# Patient Record
Sex: FEMALE | Race: WHITE | NOT HISPANIC OR LATINO | Employment: UNEMPLOYED | URBAN - METROPOLITAN AREA
[De-identification: names, ages, dates, MRNs, and addresses within clinical notes are randomized per-mention and may not be internally consistent; named-entity substitution may affect disease eponyms.]

---

## 2022-05-15 ENCOUNTER — HOSPITAL ENCOUNTER (EMERGENCY)
Facility: HOSPITAL | Age: 27
Discharge: HOME | End: 2022-05-16
Attending: FAMILY MEDICINE
Payer: MEDICAID

## 2022-05-15 DIAGNOSIS — K52.9 GASTROENTERITIS: Primary | ICD-10-CM

## 2022-05-15 LAB
APPEARANCE UR: CLEAR
B-HCG SERPL QL: NEGATIVE
BILIRUB UR QL STRIP.AUTO: NEGATIVE MG/DL
COLOR UR AUTO: YELLOW
GLUCOSE UR QL STRIP.AUTO: NEGATIVE MG/DL
KETONES UR QL STRIP.AUTO: NEGATIVE MG/DL
LEUKOCYTE ESTERASE UR QL STRIP.AUTO: NEGATIVE UNIT/L
MUCOUS THREADS URNS QL MICRO: ABNORMAL /LPF
NITRITE UR QL STRIP.AUTO: NEGATIVE
PH UR STRIP.AUTO: 5.5 [PH]
PROT UR QL STRIP.AUTO: ABNORMAL MG/DL
RBC UR QL AUTO: NEGATIVE UNIT/L
SP GR UR STRIP.AUTO: >=1.03
SQUAMOUS #/AREA URNS AUTO: ABNORMAL /LPF
UROBILINOGEN UR STRIP-ACNC: 0.2 MG/DL

## 2022-05-15 PROCEDURE — 36415 COLL VENOUS BLD VENIPUNCTURE: CPT | Performed by: FAMILY MEDICINE

## 2022-05-15 PROCEDURE — 81025 URINE PREGNANCY TEST: CPT | Performed by: FAMILY MEDICINE

## 2022-05-15 PROCEDURE — 99284 EMERGENCY DEPT VISIT MOD MDM: CPT

## 2022-05-15 PROCEDURE — 25000003 PHARM REV CODE 250: Performed by: FAMILY MEDICINE

## 2022-05-15 PROCEDURE — 81001 URINALYSIS AUTO W/SCOPE: CPT | Performed by: FAMILY MEDICINE

## 2022-05-15 RX ORDER — ARIPIPRAZOLE 5 MG/1
5 TABLET ORAL
COMMUNITY
Start: 2022-01-05 | End: 2023-08-30

## 2022-05-15 RX ORDER — FLUOXETINE HYDROCHLORIDE 20 MG/1
40 CAPSULE ORAL
COMMUNITY
Start: 2022-01-05 | End: 2023-08-30

## 2022-05-15 RX ORDER — TRAZODONE HYDROCHLORIDE 50 MG/1
50 TABLET ORAL
COMMUNITY
Start: 2022-01-05 | End: 2023-08-30

## 2022-05-15 RX ORDER — ONDANSETRON 4 MG/1
4 TABLET, ORALLY DISINTEGRATING ORAL
Status: COMPLETED | OUTPATIENT
Start: 2022-05-15 | End: 2022-05-15

## 2022-05-15 RX ORDER — DICYCLOMINE HYDROCHLORIDE 10 MG/1
20 CAPSULE ORAL
COMMUNITY
End: 2022-05-16

## 2022-05-15 RX ORDER — OXCARBAZEPINE 300 MG/1
300 TABLET, FILM COATED ORAL
COMMUNITY
Start: 2022-01-05 | End: 2023-08-30

## 2022-05-15 RX ORDER — SPIRONOLACTONE 25 MG/1
25 TABLET ORAL DAILY
COMMUNITY
End: 2023-08-30

## 2022-05-15 RX ADMIN — ONDANSETRON 4 MG: 4 TABLET, ORALLY DISINTEGRATING ORAL at 11:05

## 2022-05-15 NOTE — Clinical Note
"Tressa Bensonolga Zuleta was seen and treated in our emergency department on 5/15/2022.  She may return to work on 05/18/2022.       If you have any questions or concerns, please don't hesitate to call.      Milton HANSON    "

## 2022-05-16 VITALS
DIASTOLIC BLOOD PRESSURE: 78 MMHG | HEIGHT: 69 IN | OXYGEN SATURATION: 98 % | TEMPERATURE: 98 F | RESPIRATION RATE: 18 BRPM | SYSTOLIC BLOOD PRESSURE: 118 MMHG | HEART RATE: 96 BPM | BODY MASS INDEX: 37.77 KG/M2 | WEIGHT: 255 LBS

## 2022-05-16 PROCEDURE — 63600175 PHARM REV CODE 636 W HCPCS: Performed by: FAMILY MEDICINE

## 2022-05-16 PROCEDURE — 96372 THER/PROPH/DIAG INJ SC/IM: CPT | Performed by: FAMILY MEDICINE

## 2022-05-16 RX ORDER — ONDANSETRON 4 MG/1
4 TABLET, FILM COATED ORAL EVERY 6 HOURS
Qty: 20 TABLET | Refills: 0 | Status: SHIPPED | OUTPATIENT
Start: 2022-05-16 | End: 2022-05-21

## 2022-05-16 RX ORDER — DICYCLOMINE HYDROCHLORIDE 10 MG/ML
20 INJECTION INTRAMUSCULAR
Status: COMPLETED | OUTPATIENT
Start: 2022-05-16 | End: 2022-05-16

## 2022-05-16 RX ORDER — DICYCLOMINE HYDROCHLORIDE 20 MG/1
20 TABLET ORAL 2 TIMES DAILY
Qty: 10 TABLET | Refills: 0 | Status: SHIPPED | OUTPATIENT
Start: 2022-05-16 | End: 2022-05-21

## 2022-05-16 RX ADMIN — DICYCLOMINE HYDROCHLORIDE 20 MG: 20 INJECTION, SOLUTION INTRAMUSCULAR at 12:05

## 2022-05-16 NOTE — ED PROVIDER NOTES
Encounter Date: 5/15/2022       History     Chief Complaint   Patient presents with    Abdominal Pain     Onset yesterday abdominal cramps,subjective fever,N,V,D     27-year-old female with essentially no past medical history presents with nausea vomiting diarrhea the past 3 days.  Patient states her vomiting has resolved and she only has mild nausea.  She is still having diarrhea.  She also admits to generalized abdominal cramping.  Denies fever or sick contacts.  Denies recent travel.  She is drinking a chocolate milk in exam room.  No other complaints.        Review of patient's allergies indicates:   Allergen Reactions    Amoxicillin Rash     Past Medical History:   Diagnosis Date    Bipolar disorder, unspecified      Past Surgical History:   Procedure Laterality Date    CHOLECYSTECTOMY       No family history on file.  Social History     Tobacco Use    Smoking status: Never Smoker    Smokeless tobacco: Never Used   Substance Use Topics    Alcohol use: Never    Drug use: Never     Review of Systems   Constitutional: Negative.    HENT: Negative.    Eyes: Negative.    Respiratory: Negative.    Cardiovascular: Negative.    Gastrointestinal: Positive for diarrhea, nausea and vomiting.   Endocrine: Negative.    Genitourinary: Negative.    Musculoskeletal: Negative.    Skin: Negative.    Allergic/Immunologic: Negative.    Neurological: Negative.    Hematological: Negative.    Psychiatric/Behavioral: Negative.        Physical Exam     Initial Vitals [05/15/22 2124]   BP Pulse Resp Temp SpO2   (!) 124/90 95 20 97.9 °F (36.6 °C) 97 %      MAP       --         Physical Exam    Nursing note and vitals reviewed.  Constitutional: She appears well-developed.   HENT:   Head: Normocephalic and atraumatic.   Eyes: Pupils are equal, round, and reactive to light.   Neck:   Normal range of motion.  Cardiovascular: Normal rate and regular rhythm.   Pulmonary/Chest: Breath sounds normal.   Abdominal: Abdomen is soft. Bowel  sounds are normal. She exhibits no distension. There is no abdominal tenderness.   Musculoskeletal:         General: Normal range of motion.      Cervical back: Normal range of motion.     Neurological: She is alert and oriented to person, place, and time.   Skin: Capillary refill takes less than 2 seconds.   Psychiatric: She has a normal mood and affect.         ED Course   Procedures  Labs Reviewed   URINALYSIS, REFLEX TO URINE CULTURE - Abnormal; Notable for the following components:       Result Value    Protein, UA Trace (*)     All other components within normal limits   URINALYSIS, MICROSCOPIC - Abnormal; Notable for the following components:    Mucous, UA Small (*)     Squamous Epithelial Cells, UA Few (*)     All other components within normal limits   PREGNANCY TEST, URINE RAPID - Normal   HCG, QUANTITATIVE          Imaging Results    None          Medications   ondansetron disintegrating tablet 4 mg (4 mg Oral Given 5/15/22 5951)   dicyclomine injection 20 mg (20 mg Intramuscular Given 5/16/22 0018)     Medical Decision Making:   ED Management:  Patient is nontoxic appearing and in no acute distress.  Vital signs stable.  Benign physical exam.  No episodes of vomiting or diarrhea throughout her ED stay.  Will treat for gastroenteritis.  Encourage clear liquids for the next 4-6 hours.  Advance diet as tolerated.  Call follow-up with PCP as soon as possible.  Strict return to ER precautions given, patient voiced understanding.                      Clinical Impression:   Final diagnoses:  [K52.9] Gastroenteritis (Primary)          ED Disposition Condition    Discharge Stable        ED Prescriptions     Medication Sig Dispense Start Date End Date Auth. Provider    ondansetron (ZOFRAN) 4 MG tablet Take 1 tablet (4 mg total) by mouth every 6 (six) hours. for 5 days 20 tablet 5/16/2022 5/21/2022 Husam Hamilton MD    dicyclomine (BENTYL) 20 mg tablet Take 1 tablet (20 mg total) by mouth 2 (two) times daily. for  5 days 10 tablet 5/16/2022 5/21/2022 Husam Hamilton MD        Follow-up Information     Follow up With Specialties Details Why Contact Info    Ness Garcia, ELLIE [facility]   [address] [place] [address]/[facility] Center[facility] [place] [address]  [phone]             Husam Hamilton MD  05/16/22 0041

## 2022-09-14 ENCOUNTER — HOSPITAL ENCOUNTER (EMERGENCY)
Facility: HOSPITAL | Age: 27
Discharge: HOME | End: 2022-09-14
Attending: INTERNAL MEDICINE
Payer: MEDICAID

## 2022-09-14 VITALS
OXYGEN SATURATION: 100 % | SYSTOLIC BLOOD PRESSURE: 178 MMHG | HEIGHT: 68 IN | TEMPERATURE: 98 F | HEART RATE: 75 BPM | DIASTOLIC BLOOD PRESSURE: 75 MMHG | BODY MASS INDEX: 38.19 KG/M2 | WEIGHT: 252 LBS | RESPIRATION RATE: 18 BRPM

## 2022-09-14 DIAGNOSIS — J40 BRONCHITIS: Primary | ICD-10-CM

## 2022-09-14 DIAGNOSIS — H65.192 OTHER NON-RECURRENT ACUTE NONSUPPURATIVE OTITIS MEDIA OF LEFT EAR: ICD-10-CM

## 2022-09-14 PROCEDURE — 96372 THER/PROPH/DIAG INJ SC/IM: CPT | Performed by: INTERNAL MEDICINE

## 2022-09-14 PROCEDURE — 99284 EMERGENCY DEPT VISIT MOD MDM: CPT | Mod: 25

## 2022-09-14 PROCEDURE — 63600175 PHARM REV CODE 636 W HCPCS: Performed by: INTERNAL MEDICINE

## 2022-09-14 RX ORDER — PREDNISONE 20 MG/1
60 TABLET ORAL DAILY
COMMUNITY
Start: 2022-09-13 | End: 2023-08-30

## 2022-09-14 RX ORDER — FLUTICASONE PROPIONATE 50 MCG
1 SPRAY, SUSPENSION (ML) NASAL 2 TIMES DAILY PRN
Qty: 16 G | Refills: 0 | Status: SHIPPED | OUTPATIENT
Start: 2022-09-14 | End: 2023-08-30

## 2022-09-14 RX ORDER — ALBUTEROL SULFATE 90 UG/1
2 AEROSOL, METERED RESPIRATORY (INHALATION) EVERY 6 HOURS PRN
Qty: 18 G | Refills: 0 | Status: SHIPPED | OUTPATIENT
Start: 2022-09-14 | End: 2023-08-30

## 2022-09-14 RX ORDER — AZITHROMYCIN 250 MG/1
TABLET, FILM COATED ORAL
Qty: 6 TABLET | Refills: 0 | Status: SHIPPED | OUTPATIENT
Start: 2022-09-14 | End: 2023-08-30

## 2022-09-14 RX ORDER — METHYLPREDNISOLONE SOD SUCC 125 MG
125 VIAL (EA) INJECTION ONCE
Status: COMPLETED | OUTPATIENT
Start: 2022-09-14 | End: 2022-09-14

## 2022-09-14 RX ORDER — NORETHINDRONE ACETATE AND ETHINYL ESTRADIOL, ETHINYL ESTRADIOL AND FERROUS FUMARATE 1MG-10(24)
1 KIT ORAL DAILY
COMMUNITY
Start: 2022-08-27 | End: 2023-08-30

## 2022-09-14 RX ADMIN — METHYLPREDNISOLONE SODIUM SUCCINATE 125 MG: 125 INJECTION, POWDER, FOR SOLUTION INTRAMUSCULAR; INTRAVENOUS at 09:09

## 2022-09-14 NOTE — Clinical Note
"Tressa Senior" Merlyn was seen and treated in our emergency department on 9/14/2022.  She may return to work on 09/15/2022.       If you have any questions or concerns, please don't hesitate to call.      renetta HANSON    "

## 2022-09-14 NOTE — ED TRIAGE NOTES
Pt complaint of (1) cough and fever 99.2 last night with evaluation at [facility] relating that she noticed blood with cough this am and (2) urine is a dark orange color

## 2022-09-17 NOTE — ED PROVIDER NOTES
Source of History:  Patient, no limitations    Chief complaint:  Recheck (Pt complaint of (1) cough and fever 99.2 last ngith with evaluation at [facility] relating that she noticed blood with cough this am and (2) urine is a dark orange color)      HPI:  Tressa Zuleta is a 27 y.o. female presenting with Recheck (Pt complaint of (1) cough and fever 99.2 last ngith with evaluation at [facility] relating that she noticed blood with cough this am and (2) urine is a dark orange color)       Cough, subjective fever and chills, left ear pain, sore throat. Seen at urgent care last night swabs were negative for strep and covid, prescribed Tessalon Perles and p.o. steroids  The patient complains of productive cough. Onset of symptoms was abrupt starting 2 days ago. Symptom course has been intermittent, while severity at worst was moderate. Symptoms tend to occur with cough. Symptoms are aggravated by nothing, alleviated by rest and have been associated with blood tinged sputum.    Review of Systems   Constitutional symptoms:  Negative except as documented in HPI.   Skin symptoms:  Negative except as documented in HPI.   HEENT symptoms:  Negative except as documented in HPI.   Respiratory symptoms:  Negative except as documented in HPI.   Cardiovascular symptoms:  Negative except as documented in HPI.   Gastrointestinal symptoms:  Negative except as documented in HPI.    Genitourinary symptoms:  Negative except as documented in HPI.   Musculoskeletal symptoms:  Negative except as documented in HPI.   Neurologic symptoms:  Negative except as documented in HPI.   Psychiatric symptoms:  Negative except as documented in HPI.   Allergy/immunologic symptoms:  Negative except as documented in HPI.             Additional review of systems information: All other systems reviewed and otherwise negative.      Review of patient's allergies indicates:   Allergen Reactions    Amoxicillin Rash       PMH:  As per  "HPI and below:    Past Medical History:   Diagnosis Date    Bipolar disorder, unspecified         No family history on file.    Past Surgical History:   Procedure Laterality Date    CHOLECYSTECTOMY         Social History     Tobacco Use    Smoking status: Never    Smokeless tobacco: Never   Substance Use Topics    Alcohol use: Never    Drug use: Never       There is no problem list on file for this patient.       Physical Exam:    BP (!) 178/75 (BP Location: Left arm, Patient Position: Sitting)   Pulse 75   Temp 97.9 °F (36.6 °C) (Oral)   Resp 18   Ht 5' 8" (1.727 m)   Wt 114.3 kg (252 lb)   LMP 09/01/2022   SpO2 100%   Breastfeeding No   BMI 38.32 kg/m²     Nursing note and vital signs reviewed.    General:  Alert, no acute distress.   Skin: Normal for Ethnic Origin, No cyanosis  HEENT: Normocephalic and atraumatic, Vision unchanged, Pupils symmetric, No icterus , Nasal mucosa is pink and moist, left OM scar tissue with some erythema, mild pharyngeal erythema  Cardiovascular:  Regular rate and rhythm, No edema  Chest Wall: No deformity, equal chest rise  Respiratory:  Lungs are clear to auscultation, respirations are non-labored.    Musculoskeletal:  No deformity, Normal perfusion to all extremities  Back: No bony tenderness  : No suprapubic pain, No CVA tenderness  Gastrointestinal:  Soft, Nontender, Non distended, Normal bowel sounds.    Neurological:  Alert and oriented to person, place, time, and situation, normal motor observed, normal speech observed.    Psychiatric:  Cooperative, appropriate mood & affect.        Labs that have been ordered have been independently reviewed and interpreted by myself.     Old Chart Reviewed.      Initial Impression/ Differential Dx:  Bronchitis, URI, allergies, irritants, pulmonary edema, GERD, laryngitis, tracheitis, asthma, sinusitis, pneumonia, viral, COPD, medication side effect      MDM:      Reviewed Nurses Note.    Reviewed Pertinent old records.    Orders " Placed This Encounter    X-Ray Chest PA And Lateral    Urinalysis, Reflex to Urine Culture Urine, Clean Catch    Pregnancy, urine rapid    albuterol (VENTOLIN HFA) 90 mcg/actuation inhaler    fluticasone propionate (FLONASE) 50 mcg/actuation nasal spray    methylPREDNISolone sodium succinate injection 125 mg    azithromycin (Z-SIMONE) 250 MG tablet                    Labs Reviewed   URINALYSIS, REFLEX TO URINE CULTURE   PREGNANCY TEST, URINE RAPID          X-Ray Chest PA And Lateral   Final Result      No acute chest disease is identified.         Electronically signed by: Shon Lane   Date:    09/14/2022   Time:    09:35           No visits with results within 1 Day(s) from this visit.   Latest known visit with results is:   Admission on 05/15/2022, Discharged on 05/16/2022   Component Date Value Ref Range Status    Color, UA 05/15/2022 Yellow  Yellow, Colorless, Other, Clear Final    Appearance, UA 05/15/2022 Clear  Clear Final    Specific Gravity, UA 05/15/2022 >=1.030   Final    pH, UA 05/15/2022 5.5  5.0, 5.5, 6.0, 6.5, 7.0, 7.5, 8.0, 8.5 Final    Protein, UA 05/15/2022 Trace (A)  Negative, 300  mg/dL Final    Glucose, UA 05/15/2022 Negative  Negative, Normal mg/dL Final    Ketones, UA 05/15/2022 Negative  Negative, +1, +2, +3, +4, +5, >=160 mg/dL Final    Blood, UA 05/15/2022 Negative  Negative unit/L Final    Bilirubin, UA 05/15/2022 Negative  Negative mg/dL Final    Urobilinogen, UA 05/15/2022 0.2  0.2, 1.0, Normal mg/dL Final    Nitrites, UA 05/15/2022 Negative  Negative Final    Leukocyte Esterase, UA 05/15/2022 Negative  Negative, 75  unit/L Final    Mucous, UA 05/15/2022 Small (A)  None Seen /LPF Final    Squamous Epithelial Cells, UA 05/15/2022 Few (A)  None Seen, Rare, Occasional, Occ /LPF Final    Beta hCG Qualitative, Urine 05/15/2022 Negative  Negative Final       Imaging Results              X-Ray Chest PA And Lateral (Final result)  Result time 09/14/22 09:35:47      Final result by Shon  MD Ariana (09/14/22 09:35:47)                   Impression:      No acute chest disease is identified.      Electronically signed by: Shon Lane  Date:    09/14/2022  Time:    09:35               Narrative:    EXAMINATION:  XR CHEST PA AND LATERAL    CLINICAL HISTORY:  cough;, .    FINDINGS:  No alveolar consolidation, effusion, or pneumothorax is seen.   The thoracic aorta is normal  cardiac silhouette, central pulmonary vessels and mediastinum are normal in size and are grossly unremarkable.   visualized osseous structures are grossly unremarkable.                                                                           Diagnostic Impression:    1. Bronchitis    2. Other non-recurrent acute nonsuppurative otitis media of left ear         ED Disposition Condition    Discharge Stable             Follow-up Information       [facility] - Emergency Dept.    Specialty: Emergency Medicine  Why: If symptoms worsen  Contact information:  [address]  [facility] [id number] [phone]                            ED Prescriptions       Medication Sig Dispense Start Date End Date Auth. Provider    albuterol (VENTOLIN HFA) 90 mcg/actuation inhaler Inhale 2 puffs into the lungs every 6 (six) hours as needed for Wheezing. Rescue 18 g 9/14/2022 -- Jt Valenzuela, DO    fluticasone propionate (FLONASE) 50 mcg/actuation nasal spray 1 spray (50 mcg total) by Each Nostril route 2 (two) times daily as needed for Rhinitis. 16 g 9/14/2022 -- Jt Burchme, DO    azithromycin (Z-SIMONE) 250 MG tablet Take 2 tablets by mouth on day 1; Take 1 tablet by mouth on days 2-5 6 tablet 9/14/2022 -- Jt Valenzuela, DO          Follow-up Information       Follow up With Specialties Details Why Contact Info    [facility] - Emergency Dept Emergency Medicine  If symptoms worsen [address]  [facility] [id number] [phone]              Jt Valenzuela,   09/14/22 0944     English

## 2023-01-18 ENCOUNTER — HOSPITAL ENCOUNTER (OUTPATIENT)
Facility: HOSPITAL | Age: 28
Discharge: HOME | End: 2023-01-18
Attending: INTERNAL MEDICINE | Admitting: INTERNAL MEDICINE
Payer: MEDICAID

## 2023-01-18 PROCEDURE — 91010 ESOPHAGUS MOTILITY STUDY: CPT | Performed by: INTERNAL MEDICINE

## 2023-01-18 RX ORDER — LIDOCAINE HYDROCHLORIDE 20 MG/ML
SOLUTION OROPHARYNGEAL
Status: DISCONTINUED
Start: 2023-01-18 | End: 2023-01-18 | Stop reason: HOSPADM

## 2023-06-27 ENCOUNTER — NURSE TRIAGE (OUTPATIENT)
Dept: ADMINISTRATIVE | Facility: CLINIC | Age: 28
End: 2023-06-27
Payer: MEDICAID

## 2023-06-27 NOTE — TELEPHONE ENCOUNTER
"Currently 10 wks preg. Vomiting since 0600 this AM. X6 episodes. Experiencing n/v since pregnancy confirmed, with worsening symptoms each day.  "Ongoing spotting for the last couple of  wks here and there".   Care advice provided per protocol, with recommendation to go to ED at this time. Pt VU.     Unable to route d/t pt no longer seeing PCP Jose or OBGYN    Reason for Disposition   [1] SEVERE vomiting (e.g., 6 or more times / day) AND [2] present > 8 hours    Additional Information   Negative: Sounds like a life-threatening emergency to the triager   Negative: [1] Vomiting AND [2] contains red blood or black ("coffee ground") material  (Exception: Few red streaks in vomit that only happened once.)   Negative: [1] Insulin-dependent diabetes (Type I) AND [2] glucose > 400 mg/dl (22 mmol/l)   Negative: Recent head injury (within last 3 days)   Negative: Recent abdominal injury (within last 3 days)   Negative: Severe pain in one eye    Protocols used: Pregnancy - Morning Sickness (Nausea and Vomiting of Pregnancy)-A-AH    "

## 2023-07-21 LAB
HBV SURFACE AG SERPL QL IA: NEGATIVE
HCV AB SERPL QL IA: NEGATIVE
RUBELLA IMMUNE STATUS: NORMAL
T PALLIDUM AB SER QL: NONREACTIVE

## 2023-08-10 ENCOUNTER — HOSPITAL ENCOUNTER (EMERGENCY)
Facility: HOSPITAL | Age: 28
Discharge: HOME | End: 2023-08-10
Attending: EMERGENCY MEDICINE
Payer: MEDICAID

## 2023-08-10 VITALS
RESPIRATION RATE: 16 BRPM | HEART RATE: 85 BPM | SYSTOLIC BLOOD PRESSURE: 137 MMHG | OXYGEN SATURATION: 100 % | HEIGHT: 68 IN | DIASTOLIC BLOOD PRESSURE: 74 MMHG | WEIGHT: 251 LBS | TEMPERATURE: 98 F | BODY MASS INDEX: 38.04 KG/M2

## 2023-08-10 DIAGNOSIS — O21.0 HYPEREMESIS GRAVIDARUM: ICD-10-CM

## 2023-08-10 DIAGNOSIS — E86.0 DEHYDRATION: ICD-10-CM

## 2023-08-10 DIAGNOSIS — Z34.92 SECOND TRIMESTER PREGNANCY: Primary | ICD-10-CM

## 2023-08-10 LAB
ALBUMIN SERPL-MCNC: 3.3 G/DL (ref 3.5–5)
ALBUMIN/GLOB SERPL: 1.1 RATIO (ref 1.1–2)
ALP SERPL-CCNC: 63 UNIT/L (ref 40–150)
ALT SERPL-CCNC: 26 UNIT/L (ref 0–55)
APPEARANCE UR: CLEAR
AST SERPL-CCNC: 21 UNIT/L (ref 5–34)
BACTERIA #/AREA URNS AUTO: NORMAL /HPF
BASOPHILS # BLD AUTO: 0.02 X10(3)/MCL
BASOPHILS NFR BLD AUTO: 0.2 %
BILIRUB SERPL-MCNC: 0.7 MG/DL
BILIRUB UR QL STRIP.AUTO: ABNORMAL
BUN SERPL-MCNC: 4.6 MG/DL (ref 7–18.7)
CALCIUM SERPL-MCNC: 8.9 MG/DL (ref 8.4–10.2)
CHLORIDE SERPL-SCNC: 108 MMOL/L (ref 98–107)
CO2 SERPL-SCNC: 21 MMOL/L (ref 22–29)
COLOR UR: ABNORMAL
CREAT SERPL-MCNC: 0.68 MG/DL (ref 0.55–1.02)
EOSINOPHIL # BLD AUTO: 0.03 X10(3)/MCL (ref 0–0.9)
EOSINOPHIL NFR BLD AUTO: 0.3 %
ERYTHROCYTE [DISTWIDTH] IN BLOOD BY AUTOMATED COUNT: 13.6 % (ref 11.5–17)
GFR SERPLBLD CREATININE-BSD FMLA CKD-EPI: >60 MLS/MIN/1.73/M2
GLOBULIN SER-MCNC: 3 GM/DL (ref 2.4–3.5)
GLUCOSE SERPL-MCNC: 113 MG/DL (ref 74–100)
GLUCOSE UR QL STRIP.AUTO: NEGATIVE
HCT VFR BLD AUTO: 36.5 % (ref 37–47)
HGB BLD-MCNC: 12.9 G/DL (ref 12–16)
IMM GRANULOCYTES # BLD AUTO: 0.07 X10(3)/MCL (ref 0–0.04)
IMM GRANULOCYTES NFR BLD AUTO: 0.6 %
KETONES UR QL STRIP.AUTO: ABNORMAL
LEUKOCYTE ESTERASE UR QL STRIP.AUTO: ABNORMAL
LYMPHOCYTES # BLD AUTO: 2.82 X10(3)/MCL (ref 0.6–4.6)
LYMPHOCYTES NFR BLD AUTO: 24.9 %
MCH RBC QN AUTO: 28.2 PG (ref 27–31)
MCHC RBC AUTO-ENTMCNC: 35.3 G/DL (ref 33–36)
MCV RBC AUTO: 79.7 FL (ref 80–94)
MONOCYTES # BLD AUTO: 0.58 X10(3)/MCL (ref 0.1–1.3)
MONOCYTES NFR BLD AUTO: 5.1 %
NEUTROPHILS # BLD AUTO: 7.81 X10(3)/MCL (ref 2.1–9.2)
NEUTROPHILS NFR BLD AUTO: 68.9 %
NITRITE UR QL STRIP.AUTO: NEGATIVE
NRBC BLD AUTO-RTO: 0 %
PH UR STRIP.AUTO: 6.5 [PH]
PLATELET # BLD AUTO: 216 X10(3)/MCL (ref 130–400)
PMV BLD AUTO: 11.4 FL (ref 7.4–10.4)
POTASSIUM SERPL-SCNC: 4 MMOL/L (ref 3.5–5.1)
PROT SERPL-MCNC: 6.3 GM/DL (ref 6.4–8.3)
PROT UR QL STRIP.AUTO: ABNORMAL
RBC # BLD AUTO: 4.58 X10(6)/MCL (ref 4.2–5.4)
RBC #/AREA URNS AUTO: 5 /HPF
RBC UR QL AUTO: ABNORMAL
SODIUM SERPL-SCNC: 139 MMOL/L (ref 136–145)
SP GR UR STRIP.AUTO: 1.02 (ref 1–1.03)
SQUAMOUS #/AREA URNS AUTO: <5 /HPF
UROBILINOGEN UR STRIP-ACNC: 1
WBC # SPEC AUTO: 11.33 X10(3)/MCL (ref 4.5–11.5)
WBC #/AREA URNS AUTO: <5 /HPF

## 2023-08-10 PROCEDURE — 96375 TX/PRO/DX INJ NEW DRUG ADDON: CPT

## 2023-08-10 PROCEDURE — 63600175 PHARM REV CODE 636 W HCPCS: Performed by: EMERGENCY MEDICINE

## 2023-08-10 PROCEDURE — 81001 URINALYSIS AUTO W/SCOPE: CPT | Performed by: EMERGENCY MEDICINE

## 2023-08-10 PROCEDURE — 25000003 PHARM REV CODE 250: Performed by: EMERGENCY MEDICINE

## 2023-08-10 PROCEDURE — 96374 THER/PROPH/DIAG INJ IV PUSH: CPT

## 2023-08-10 PROCEDURE — 96361 HYDRATE IV INFUSION ADD-ON: CPT

## 2023-08-10 PROCEDURE — 99284 EMERGENCY DEPT VISIT MOD MDM: CPT | Mod: 25

## 2023-08-10 PROCEDURE — 96372 THER/PROPH/DIAG INJ SC/IM: CPT | Mod: 59 | Performed by: EMERGENCY MEDICINE

## 2023-08-10 PROCEDURE — 80053 COMPREHEN METABOLIC PANEL: CPT | Performed by: EMERGENCY MEDICINE

## 2023-08-10 PROCEDURE — 85025 COMPLETE CBC W/AUTO DIFF WBC: CPT | Performed by: EMERGENCY MEDICINE

## 2023-08-10 RX ORDER — ONDANSETRON 8 MG/1
8 TABLET, ORALLY DISINTEGRATING ORAL EVERY 6 HOURS PRN
Qty: 20 TABLET | Refills: 0 | Status: SHIPPED | OUTPATIENT
Start: 2023-08-10 | End: 2023-09-06

## 2023-08-10 RX ORDER — DIPHENHYDRAMINE HYDROCHLORIDE 50 MG/ML
12.5 INJECTION INTRAMUSCULAR; INTRAVENOUS
Status: COMPLETED | OUTPATIENT
Start: 2023-08-10 | End: 2023-08-10

## 2023-08-10 RX ORDER — PROMETHAZINE HYDROCHLORIDE 25 MG/1
25 SUPPOSITORY RECTAL EVERY 6 HOURS PRN
Qty: 12 SUPPOSITORY | Refills: 1 | Status: SHIPPED | OUTPATIENT
Start: 2023-08-10 | End: 2023-08-30

## 2023-08-10 RX ORDER — FAMOTIDINE 10 MG/ML
40 INJECTION INTRAVENOUS
Status: COMPLETED | OUTPATIENT
Start: 2023-08-10 | End: 2023-08-10

## 2023-08-10 RX ORDER — METOCLOPRAMIDE HYDROCHLORIDE 5 MG/ML
10 INJECTION INTRAMUSCULAR; INTRAVENOUS
Status: COMPLETED | OUTPATIENT
Start: 2023-08-10 | End: 2023-08-10

## 2023-08-10 RX ORDER — PROMETHAZINE HYDROCHLORIDE 25 MG/ML
25 INJECTION, SOLUTION INTRAMUSCULAR; INTRAVENOUS
Status: COMPLETED | OUTPATIENT
Start: 2023-08-10 | End: 2023-08-10

## 2023-08-10 RX ADMIN — FAMOTIDINE 40 MG: 10 INJECTION, SOLUTION INTRAVENOUS at 04:08

## 2023-08-10 RX ADMIN — PROMETHAZINE HYDROCHLORIDE 25 MG: 25 INJECTION INTRAMUSCULAR; INTRAVENOUS at 05:08

## 2023-08-10 RX ADMIN — SODIUM CHLORIDE, POTASSIUM CHLORIDE, SODIUM LACTATE AND CALCIUM CHLORIDE 1000 ML: 600; 310; 30; 20 INJECTION, SOLUTION INTRAVENOUS at 03:08

## 2023-08-10 RX ADMIN — DIPHENHYDRAMINE HYDROCHLORIDE 12.5 MG: 50 INJECTION INTRAMUSCULAR; INTRAVENOUS at 03:08

## 2023-08-10 RX ADMIN — SODIUM CHLORIDE, POTASSIUM CHLORIDE, SODIUM LACTATE AND CALCIUM CHLORIDE 1000 ML: 600; 310; 30; 20 INJECTION, SOLUTION INTRAVENOUS at 04:08

## 2023-08-10 RX ADMIN — METOCLOPRAMIDE 10 MG: 5 INJECTION, SOLUTION INTRAMUSCULAR; INTRAVENOUS at 03:08

## 2023-08-10 NOTE — DISCHARGE INSTRUCTIONS
Take a full unisom nightly.  Try to eat frequent small meals even if you are not feeling well as having a little food on your stomach and help with the nausea.  You can also occasionally take Benadryl as needed as it can help with nausea too

## 2023-08-10 NOTE — ED PROVIDER NOTES
Encounter Date: 8/10/2023       History     Chief Complaint   Patient presents with    Vomiting      17 wks pregnant C/O vomiting q30 min since yesterday. Was seen yesterday at [facility] for same complaint, given IV fluids. No relief with zofra, phenergan, or metoclopramide.      28-year-old female  at 17 weeks' gestation presents ED for evaluation of persistent nausea and vomiting beginning yesterday.  [facility] and was given Reglan and Benadryl with some improvement.  She is prescribed p.o. Reglan but is unable to keep that down nor is she able to keep down oral Phenergan but can take ODT Zofran.  She is been unable to tolerate p.o. since yesterday.  She felt better at time of discharge however symptoms returned after 1 hour.  She denies any leakage of fluids, dysuria or hematuria, vaginal bleeding or lower abdominal cramping.  She also takes B6 and Unisom every night as well as Pepcid    The history is provided by the patient. No  was used.   Emesis   This is a chronic problem. The current episode started several weeks ago. The problem occurs 2 - 4 times per day. The problem has been unchanged. The emesis has an appearance of stomach contents. Pertinent negatives include no abdominal pain, no cough and no fever.     Review of patient's allergies indicates:   Allergen Reactions    Amoxicillin Rash     Past Medical History:   Diagnosis Date    Bipolar disorder, unspecified      Past Surgical History:   Procedure Laterality Date    CHOLECYSTECTOMY      ESOPHAGEAL MOTILITY STUDY USING HIGH RESOLUTION MANOMETRY N/A 2023    Procedure: 730am MOTILITY w/ RAPID SWALLOW;  Surgeon: Florin Ferraro MD;  Location: [facility] ENDOSCOPY;  Service: Gastroenterology;  Laterality: N/A;  Scheduled for 730     History reviewed. No pertinent family history.  Social History     Tobacco Use    Smoking status: Never    Smokeless tobacco: Never   Substance Use Topics     Alcohol use: Never    Drug use: Never     Review of Systems   Constitutional:  Negative for activity change, diaphoresis, fatigue and fever.   HENT:  Negative for congestion, postnasal drip, rhinorrhea, sinus pain, sneezing and sore throat.    Respiratory:  Negative for cough, chest tightness, shortness of breath and wheezing.    Cardiovascular:  Negative for chest pain, palpitations and leg swelling.   Gastrointestinal:  Positive for nausea and vomiting. Negative for abdominal distention, abdominal pain and blood in stool.   Genitourinary:  Negative for decreased urine volume, difficulty urinating and dysuria.   Musculoskeletal: Negative.    Skin:  Negative for color change and pallor.   Neurological:  Negative for dizziness, speech difficulty, weakness, light-headedness and numbness.   All other systems reviewed and are negative.      Physical Exam     Initial Vitals [08/10/23 0301]   BP Pulse Resp Temp SpO2   124/80 90 16 98.3 °F (36.8 °C) 97 %      MAP       --         Physical Exam    Nursing note and vitals reviewed.  Constitutional: She appears well-developed and well-nourished. She is not diaphoretic. No distress.   HENT:   Head: Normocephalic and atraumatic.   Nose: Nose normal.   Mouth/Throat: Oropharynx is clear and moist.   Eyes: Conjunctivae and EOM are normal. Pupils are equal, round, and reactive to light.   Neck: Trachea normal. Neck supple.   Normal range of motion.  Cardiovascular:  Normal rate, regular rhythm, normal heart sounds and intact distal pulses.           No murmur heard.  Pulmonary/Chest: Breath sounds normal. No respiratory distress. She has no wheezes. She has no rhonchi. She has no rales. She exhibits no tenderness.   Abdominal: Abdomen is soft. Bowel sounds are normal. She exhibits no distension and no mass. There is no abdominal tenderness. There is no rebound and no guarding.   Musculoskeletal:         General: No tenderness or edema. Normal range of motion.      Cervical back:  Normal range of motion and neck supple.      Lumbar back: Normal. Normal range of motion.     Neurological: She is alert and oriented to person, place, and time. She has normal strength. No cranial nerve deficit or sensory deficit.   Skin: Skin is warm and dry. Capillary refill takes less than 2 seconds. No abscess noted. No erythema. No pallor.   Psychiatric: She has a normal mood and affect. Her behavior is normal. Judgment and thought content normal.         ED Course   Procedures  Labs Reviewed   COMPREHENSIVE METABOLIC PANEL - Abnormal; Notable for the following components:       Result Value    Chloride 108 (*)     Carbon Dioxide 21 (*)     Glucose Level 113 (*)     Blood Urea Nitrogen 4.6 (*)     Protein Total 6.3 (*)     Albumin Level 3.3 (*)     All other components within normal limits   URINALYSIS, REFLEX TO URINE CULTURE - Abnormal; Notable for the following components:    Protein, UA Trace (*)     Ketones, UA 3+ (*)     Blood, UA Trace (*)     Bilirubin, UA 1+ (*)     Leukocyte Esterase, UA Trace (*)     All other components within normal limits   CBC WITH DIFFERENTIAL - Abnormal; Notable for the following components:    Hct 36.5 (*)     MCV 79.7 (*)     MPV 11.4 (*)     IG# 0.07 (*)     All other components within normal limits   URINALYSIS, MICROSCOPIC - Normal   CBC W/ AUTO DIFFERENTIAL    Narrative:     The following orders were created for panel order CBC auto differential.  Procedure                               Abnormality         Status                     ---------                               -----------         ------                     CBC with Differential[[id number]]        Abnormal            Final result                 Please view results for these tests on the individual orders.          Imaging Results    None          Medications   lactated ringers bolus 1,000 mL (0 mLs Intravenous Stopped 8/10/23 2483)   metoclopramide HCl injection 10 mg (10 mg Intravenous Given 8/10/23 6939)    diphenhydrAMINE injection 12.5 mg (12.5 mg Intravenous Given 8/10/23 0324)   lactated ringers bolus 1,000 mL (0 mLs Intravenous Stopped 8/10/23 0551)   famotidine (PF) injection 40 mg (40 mg Intravenous Given 8/10/23 3799)   promethazine injection 25 mg (25 mg Intramuscular Given 8/10/23 6786)   Medical Decision Making  Given patient's presentation, differential diagnosis includes but is not limited to hyperemesis gravidarum, electrolyte derangement, dehydration, uti  To evaluate these  possible etiologies cbc, cmp, ua, fht were ordered and reviewed  Mild anemia and metabolic acidosis, otherwise unremarkable, had ua within 24 hous ago that was normal  Given reglan, benadryl with improvement, wanted to try crackers, felt nauseated afterwards and belching, given pepcid, continued nausea no vomiting, I'm phenergan given with resolution, rx for rectal phenergan and 8 mg odt zofran as she has <9 4 mg and has been told to take 2 at a time. Instructed to take a full unisom and notify her obgyn of her ed visit. She voiced understanding and agrees and is comfortable with plan    Problems Addressed:  Dehydration: acute illness or injury that poses a threat to life or bodily functions  Hyperemesis gravidarum: acute illness or injury that poses a threat to life or bodily functions  Second trimester pregnancy: acute illness or injury that poses a threat to life or bodily functions    Amount and/or Complexity of Data Reviewed  External Data Reviewed: notes.  Labs: ordered.    Risk  OTC drugs.  Prescription drug management.        Medical Decision Making:   History:   Old Medical Records: I decided to obtain old medical records.  Old Records Summarized: records from another hospital.       <> Summary of Records: [facility]  Initial Assessment:   See hpi  Clinical Tests:   Lab Tests: Ordered and Reviewed             ED Course as of 08/10/23 0629   Thu Aug 10, 2023   0336 Fht 140 [BS]   0299 Tolerated  a couple of crackers at this time [BS]   0449 Ua at osh normal [BS]   0521 Endorses nausea, no vomiting [BS]      ED Course User Index  [BS] Loyda Fontenot MD                 Clinical Impression:   Final diagnoses:  [Z34.92] Second trimester pregnancy (Primary)  [O21.0] Hyperemesis gravidarum  [E86.0] Dehydration        ED Disposition Condition    Discharge Stable          ED Prescriptions       Medication Sig Dispense Start Date End Date Auth. Provider    promethazine (PHENERGAN) 25 MG suppository Place 1 suppository (25 mg total) rectally every 6 (six) hours as needed for Nausea. 12 suppository 8/10/2023 -- Loyda Fontenot MD    ondansetron (ZOFRAN-ODT) 8 MG TbDL Take 1 tablet (8 mg total) by mouth every 6 (six) hours as needed (nausea, vomiting). 20 tablet 8/10/2023 -- Loyda Fontenot MD          Follow-up Information       Follow up With Specialties Details Why Contact Info    Jaswant Cherry MD Obstetrics and Gynecology Schedule an appointment as soon as possible for a visit   [address]  [facility] [address]  [phone]      [facility] - Emergency Dept Emergency Medicine  As needed, If symptoms worsen [address] [facility] [id number] [phone]             Loyda Fontenot MD  08/10/23 0629

## 2023-08-30 PROBLEM — O09.92 SUPERVISION OF HIGH RISK PREGNANCY IN SECOND TRIMESTER: Status: ACTIVE | Noted: 2023-08-30

## 2023-08-30 PROBLEM — N94.10 DYSPAREUNIA IN FEMALE: Status: ACTIVE | Noted: 2023-08-30

## 2023-08-30 PROBLEM — O21.0 HYPEREMESIS AFFECTING PREGNANCY, ANTEPARTUM: Status: ACTIVE | Noted: 2023-08-30

## 2023-08-30 PROBLEM — F31.9 BIPOLAR DISORDER, UNSPECIFIED: Status: ACTIVE | Noted: 2023-08-30

## 2023-09-06 RX ORDER — ONDANSETRON 8 MG/1
TABLET, ORALLY DISINTEGRATING ORAL
Qty: 20 TABLET | Refills: 0 | Status: SHIPPED | OUTPATIENT
Start: 2023-09-06 | End: 2023-09-28 | Stop reason: SDUPTHER

## 2023-10-23 LAB — RPR: NON REACTIVE

## 2023-10-25 PROBLEM — R73.09 ELEVATED GLUCOSE: Status: ACTIVE | Noted: 2023-10-25

## 2023-11-29 PROBLEM — K59.00 CONSTIPATION: Status: ACTIVE | Noted: 2023-11-29

## 2023-12-06 PROBLEM — O99.113 BENIGN GESTATIONAL THROMBOCYTOPENIA IN THIRD TRIMESTER: Status: ACTIVE | Noted: 2023-12-06

## 2023-12-06 PROBLEM — D69.6 BENIGN GESTATIONAL THROMBOCYTOPENIA IN THIRD TRIMESTER: Status: ACTIVE | Noted: 2023-12-06

## 2023-12-15 ENCOUNTER — HOSPITAL ENCOUNTER (EMERGENCY)
Facility: HOSPITAL | Age: 28
Discharge: HOME | End: 2023-12-15
Attending: OBSTETRICS & GYNECOLOGY
Payer: MEDICAID

## 2023-12-15 VITALS
DIASTOLIC BLOOD PRESSURE: 86 MMHG | RESPIRATION RATE: 16 BRPM | HEART RATE: 95 BPM | TEMPERATURE: 98 F | OXYGEN SATURATION: 98 % | SYSTOLIC BLOOD PRESSURE: 129 MMHG

## 2023-12-15 PROBLEM — O36.8130 DECREASED FETAL MOVEMENTS IN THIRD TRIMESTER: Status: ACTIVE | Noted: 2023-12-15

## 2023-12-15 PROCEDURE — 63600175 PHARM REV CODE 636 W HCPCS: Performed by: OBSTETRICS & GYNECOLOGY

## 2023-12-15 PROCEDURE — 96372 THER/PROPH/DIAG INJ SC/IM: CPT | Performed by: OBSTETRICS & GYNECOLOGY

## 2023-12-15 PROCEDURE — 99284 EMERGENCY DEPT VISIT MOD MDM: CPT

## 2023-12-15 RX ORDER — TERBUTALINE SULFATE 1 MG/ML
INJECTION SUBCUTANEOUS
Status: DISCONTINUED
Start: 2023-12-15 | End: 2023-12-15 | Stop reason: HOSPADM

## 2023-12-15 RX ORDER — TERBUTALINE SULFATE 1 MG/ML
0.25 INJECTION SUBCUTANEOUS ONCE
Status: COMPLETED | OUTPATIENT
Start: 2023-12-15 | End: 2023-12-15

## 2023-12-15 RX ADMIN — TERBUTALINE SULFATE 0.25 MG: 1 INJECTION, SOLUTION SUBCUTANEOUS at 07:12

## 2023-12-15 NOTE — ED TRIAGE NOTES
Marker button given to patient. Instructed to press button when feeling fetal movement. Pt verbalizes understanding

## 2023-12-16 NOTE — ED PROVIDER NOTES
SUSI NOTE  [facility]     Admit Date: 12/15/2023  SUSI Physician: Solo Dueñas      Admit Diagnosis/Chief Complaint:   IUP at 34w6d  Decrease fetal movement   contractions  Discharge Diagnosis:    IUP 34w6d   same    Chief Complaint   Patient presents with    decreased fetal movment     IUP 34.6w c/o decreased fetal movement x3 days       Hospital History and Physical:  Tressa Sheppard is a 28 y.o.  at 34w6d presents complaining of decrease fetal movement. No loss of fluid or vaginal bleeding.  Having PTC given one dose of procardia with resolving symptoms.     There are no hospital problems to display for this patient.        /86   Pulse 95   Temp 97.9 °F (36.6 °C)   Resp 16   LMP 04/15/2023   SpO2 98%   Breastfeeding No   Temp:  [97.9 °F (36.6 °C)] 97.9 °F (36.6 °C)  Pulse:  [] 95  Resp:  [16] 16  SpO2:  [97 %-100 %] 98 %  BP: (124-133)/(83-87) 129/86    General: alert and cooperative  Cardiovascular: rate and rhythm, S1, S2 normal   Respiratory: clear to auscultation bilaterally  Abdominal: gravid, non-tender  Extremeties: non-tender, no edema    SVE (PeriWATCH)  Dilation (cm): 1.5     FHT: Category: 1  Amoret: rare  SVE: SVE (PeriWATCH)  Dilation (cm): 1.5        LABS:   No results found for this or any previous visit (from the past 24 hour(s)).    Imaging Results    None          ASSESMENT/CLINICAL IMPRESSION:    Tressa Sheppard is a 28 y.o.   at 34w6d   Decrease fetal movement: Cat 1 FHT  PTC: 1cm resolved          Disposition:  discharged to home    Medications:   none    Patient Instructions:   - Pt was given routine pregnancy instructions including to return to triage if she had any vaginal bleeding (other than spotting for the next 48hrs), any loss of fluid like her water broke, decreased fetal movement, or contractions Q 5min lasting for 2 or more hours. Pt was also instructed to drink copious water. Patient voiced understanding of all these  instructions and was subsequently discharged home. Tylenol use and maternity belt use discussed. All questions answered. Pt left SUSI with good understanding of plan.     She will follow up with her primary OB as scheduled      Solo Dueñas MD  OB-GYN Hospitalist

## 2023-12-18 PROBLEM — O36.8130 DECREASED FETAL MOVEMENTS IN THIRD TRIMESTER: Status: RESOLVED | Noted: 2023-12-15 | Resolved: 2023-12-18

## 2023-12-18 PROBLEM — O13.3 GESTATIONAL HYPERTENSION, THIRD TRIMESTER: Status: ACTIVE | Noted: 2023-12-18

## 2023-12-18 PROBLEM — R03.0 ELEVATED BLOOD PRESSURE READING WITHOUT DIAGNOSIS OF HYPERTENSION: Status: ACTIVE | Noted: 2023-12-18

## 2023-12-18 PROBLEM — R03.0 ELEVATED BLOOD PRESSURE READING WITHOUT DIAGNOSIS OF HYPERTENSION: Status: RESOLVED | Noted: 2023-12-18 | Resolved: 2023-12-18

## 2023-12-21 PROBLEM — O26.643 INTRAHEPATIC CHOLESTASIS OF PREGNANCY IN THIRD TRIMESTER: Status: ACTIVE | Noted: 2023-12-21

## 2023-12-21 LAB — PRENATAL STREP B CULTURE: NEGATIVE

## 2023-12-24 ENCOUNTER — HOSPITAL ENCOUNTER (OUTPATIENT)
Facility: HOSPITAL | Age: 28
Discharge: HOME | End: 2023-12-24
Attending: STUDENT IN AN ORGANIZED HEALTH CARE EDUCATION/TRAINING PROGRAM | Admitting: STUDENT IN AN ORGANIZED HEALTH CARE EDUCATION/TRAINING PROGRAM
Payer: MEDICAID

## 2023-12-24 VITALS — HEART RATE: 67 BPM | DIASTOLIC BLOOD PRESSURE: 79 MMHG | OXYGEN SATURATION: 99 % | SYSTOLIC BLOOD PRESSURE: 119 MMHG

## 2023-12-24 DIAGNOSIS — O13.3 GESTATIONAL HYPERTENSION, THIRD TRIMESTER: ICD-10-CM

## 2023-12-24 LAB
ALBUMIN SERPL-MCNC: 2.7 G/DL (ref 3.5–5)
ALBUMIN/GLOB SERPL: 0.9 RATIO (ref 1.1–2)
ALP SERPL-CCNC: 164 UNIT/L (ref 40–150)
ALT SERPL-CCNC: 14 UNIT/L (ref 0–55)
APPEARANCE UR: ABNORMAL
AST SERPL-CCNC: 16 UNIT/L (ref 5–34)
BACTERIA #/AREA URNS AUTO: ABNORMAL /HPF
BASOPHILS # BLD AUTO: 0.02 X10(3)/MCL
BASOPHILS NFR BLD AUTO: 0.2 %
BILIRUB SERPL-MCNC: 0.4 MG/DL
BILIRUB UR QL STRIP.AUTO: NEGATIVE
BUN SERPL-MCNC: 8.6 MG/DL (ref 7–18.7)
CALCIUM SERPL-MCNC: 8.6 MG/DL (ref 8.4–10.2)
CHLORIDE SERPL-SCNC: 108 MMOL/L (ref 98–107)
CO2 SERPL-SCNC: 18 MMOL/L (ref 22–29)
COLOR UR AUTO: YELLOW
CREAT SERPL-MCNC: 0.71 MG/DL (ref 0.55–1.02)
CREAT UR-MCNC: 377.4 MG/DL (ref 45–106)
EOSINOPHIL # BLD AUTO: 0.05 X10(3)/MCL (ref 0–0.9)
EOSINOPHIL NFR BLD AUTO: 0.6 %
ERYTHROCYTE [DISTWIDTH] IN BLOOD BY AUTOMATED COUNT: 13.1 % (ref 11.5–17)
GFR SERPLBLD CREATININE-BSD FMLA CKD-EPI: >60 MLS/MIN/1.73/M2
GLOBULIN SER-MCNC: 3 GM/DL (ref 2.4–3.5)
GLUCOSE SERPL-MCNC: 111 MG/DL (ref 74–100)
GLUCOSE UR QL STRIP.AUTO: NORMAL
HCT VFR BLD AUTO: 35.9 % (ref 37–47)
HGB BLD-MCNC: 11.7 G/DL (ref 12–16)
IMM GRANULOCYTES # BLD AUTO: 0.08 X10(3)/MCL (ref 0–0.04)
IMM GRANULOCYTES NFR BLD AUTO: 0.9 %
KETONES UR QL STRIP.AUTO: ABNORMAL
LDH SERPL-CCNC: 156 U/L (ref 125–220)
LEUKOCYTE ESTERASE UR QL STRIP.AUTO: 500
LYMPHOCYTES # BLD AUTO: 2.34 X10(3)/MCL (ref 0.6–4.6)
LYMPHOCYTES NFR BLD AUTO: 26.8 %
MCH RBC QN AUTO: 25.9 PG (ref 27–31)
MCHC RBC AUTO-ENTMCNC: 32.6 G/DL (ref 33–36)
MCV RBC AUTO: 79.6 FL (ref 80–94)
MONOCYTES # BLD AUTO: 0.39 X10(3)/MCL (ref 0.1–1.3)
MONOCYTES NFR BLD AUTO: 4.5 %
MUCOUS THREADS URNS QL MICRO: ABNORMAL /LPF
NEUTROPHILS # BLD AUTO: 5.86 X10(3)/MCL (ref 2.1–9.2)
NEUTROPHILS NFR BLD AUTO: 67 %
NITRITE UR QL STRIP.AUTO: NEGATIVE
NRBC BLD AUTO-RTO: 0 %
PH UR STRIP.AUTO: 6 [PH]
PLATELET # BLD AUTO: 169 X10(3)/MCL (ref 130–400)
PMV BLD AUTO: 12.8 FL (ref 7.4–10.4)
POTASSIUM SERPL-SCNC: 4 MMOL/L (ref 3.5–5.1)
PROT SERPL-MCNC: 5.7 GM/DL (ref 6.4–8.3)
PROT UR QL STRIP.AUTO: ABNORMAL
PROT UR STRIP-MCNC: 27.2 MG/DL
RBC # BLD AUTO: 4.51 X10(6)/MCL (ref 4.2–5.4)
RBC #/AREA URNS AUTO: ABNORMAL /HPF
RBC UR QL AUTO: NEGATIVE
SODIUM SERPL-SCNC: 135 MMOL/L (ref 136–145)
SP GR UR STRIP.AUTO: 1.03 (ref 1–1.03)
SQUAMOUS #/AREA URNS LPF: ABNORMAL /HPF
URATE SERPL-MCNC: 6.5 MG/DL (ref 2.6–6)
URINE PROTEIN/CREATININE RATIO (OLG): 0.1
UROBILINOGEN UR STRIP-ACNC: NORMAL
WBC # SPEC AUTO: 8.74 X10(3)/MCL (ref 4.5–11.5)
WBC #/AREA URNS AUTO: ABNORMAL /HPF

## 2023-12-24 PROCEDURE — 81001 URINALYSIS AUTO W/SCOPE: CPT | Performed by: STUDENT IN AN ORGANIZED HEALTH CARE EDUCATION/TRAINING PROGRAM

## 2023-12-24 PROCEDURE — 85025 COMPLETE CBC W/AUTO DIFF WBC: CPT | Performed by: STUDENT IN AN ORGANIZED HEALTH CARE EDUCATION/TRAINING PROGRAM

## 2023-12-24 PROCEDURE — 82570 ASSAY OF URINE CREATININE: CPT | Performed by: STUDENT IN AN ORGANIZED HEALTH CARE EDUCATION/TRAINING PROGRAM

## 2023-12-24 PROCEDURE — G0379 DIRECT REFER HOSPITAL OBSERV: HCPCS

## 2023-12-24 PROCEDURE — 80053 COMPREHEN METABOLIC PANEL: CPT | Performed by: STUDENT IN AN ORGANIZED HEALTH CARE EDUCATION/TRAINING PROGRAM

## 2023-12-24 PROCEDURE — 59025 FETAL NON-STRESS TEST: CPT

## 2023-12-24 PROCEDURE — G0378 HOSPITAL OBSERVATION PER HR: HCPCS

## 2023-12-24 PROCEDURE — 83615 LACTATE (LD) (LDH) ENZYME: CPT | Performed by: STUDENT IN AN ORGANIZED HEALTH CARE EDUCATION/TRAINING PROGRAM

## 2023-12-24 PROCEDURE — 84550 ASSAY OF BLOOD/URIC ACID: CPT | Performed by: STUDENT IN AN ORGANIZED HEALTH CARE EDUCATION/TRAINING PROGRAM

## 2023-12-24 PROCEDURE — 87086 URINE CULTURE/COLONY COUNT: CPT | Performed by: STUDENT IN AN ORGANIZED HEALTH CARE EDUCATION/TRAINING PROGRAM

## 2023-12-24 NOTE — NURSING
Spoke with lab regarding protein/creatinine ratio being ordered and asked if there was enough urine to run on sample in lab. Was informed there was enough

## 2023-12-24 NOTE — NURSING
Spoke with lab regarding eta of protein/creatinine ratio. Was informed had to be reran but should not take long

## 2023-12-25 NOTE — DISCHARGE SUMMARY
[facility] - Antepartum  Discharge Summary  OBGYN    Admission date: 12/24/2023  Discharge date: 12/24/2023    Admit Dx:    Gestational hypertension, third trimester [O13.3]  Discharge Dx:  Gestational hypertension, third trimester [O13.3]    Attending Physician: Akash Caballero   Discharge Provider: Akash Caballero    Procedures Performed: * No surgery found *    Hospital Course: Patient was admitted on 12/24/2023 .  Procedure was uncomplicated, for full details see operative report. Barring any unforseen incidents, patient will be discharged home when she is able to ambulate, void, and tolerate PO intake.    Consults: none      Discharged Condition: stable    Disposition: Home    Follow Up:   1 weeks in office.

## 2023-12-26 LAB — BACTERIA UR CULT: NORMAL

## 2024-01-03 ENCOUNTER — ANESTHESIA EVENT (OUTPATIENT)
Dept: OBSTETRICS AND GYNECOLOGY | Facility: HOSPITAL | Age: 29
End: 2024-01-03
Payer: MEDICAID

## 2024-01-03 ENCOUNTER — ANESTHESIA (OUTPATIENT)
Dept: OBSTETRICS AND GYNECOLOGY | Facility: HOSPITAL | Age: 29
End: 2024-01-03
Payer: MEDICAID

## 2024-01-03 ENCOUNTER — HOSPITAL ENCOUNTER (INPATIENT)
Facility: HOSPITAL | Age: 29
LOS: 3 days | Discharge: HOME | End: 2024-01-06
Attending: STUDENT IN AN ORGANIZED HEALTH CARE EDUCATION/TRAINING PROGRAM | Admitting: STUDENT IN AN ORGANIZED HEALTH CARE EDUCATION/TRAINING PROGRAM
Payer: MEDICAID

## 2024-01-03 DIAGNOSIS — Z34.90 ENCOUNTER FOR INDUCTION OF LABOR: Primary | ICD-10-CM

## 2024-01-03 PROBLEM — K59.00 CONSTIPATION: Status: RESOLVED | Noted: 2023-11-29 | Resolved: 2024-01-03

## 2024-01-03 LAB
ABORH RETYPE: NORMAL
ALBUMIN SERPL-MCNC: 2.8 G/DL (ref 3.5–5)
ALBUMIN/GLOB SERPL: 0.9 RATIO (ref 1.1–2)
ALP SERPL-CCNC: 179 UNIT/L (ref 40–150)
ALT SERPL-CCNC: 15 UNIT/L (ref 0–55)
AST SERPL-CCNC: 20 UNIT/L (ref 5–34)
BASOPHILS # BLD AUTO: 0.03 X10(3)/MCL
BASOPHILS NFR BLD AUTO: 0.4 %
BILIRUB SERPL-MCNC: 0.4 MG/DL
BUN SERPL-MCNC: 10.3 MG/DL (ref 7–18.7)
CALCIUM SERPL-MCNC: 8.6 MG/DL (ref 8.4–10.2)
CHLORIDE SERPL-SCNC: 109 MMOL/L (ref 98–107)
CO2 SERPL-SCNC: 21 MMOL/L (ref 22–29)
CREAT SERPL-MCNC: 0.72 MG/DL (ref 0.55–1.02)
EOSINOPHIL # BLD AUTO: 0.03 X10(3)/MCL (ref 0–0.9)
EOSINOPHIL NFR BLD AUTO: 0.4 %
ERYTHROCYTE [DISTWIDTH] IN BLOOD BY AUTOMATED COUNT: 13.7 % (ref 11.5–17)
GFR SERPLBLD CREATININE-BSD FMLA CKD-EPI: >60 MLS/MIN/1.73/M2
GLOBULIN SER-MCNC: 3 GM/DL (ref 2.4–3.5)
GLUCOSE SERPL-MCNC: 76 MG/DL (ref 74–100)
GROUP & RH: NORMAL
HCT VFR BLD AUTO: 34.5 % (ref 37–47)
HGB BLD-MCNC: 11.2 G/DL (ref 12–16)
IMM GRANULOCYTES # BLD AUTO: 0.06 X10(3)/MCL (ref 0–0.04)
IMM GRANULOCYTES NFR BLD AUTO: 0.8 %
INDIRECT COOMBS: NORMAL
LYMPHOCYTES # BLD AUTO: 2.2 X10(3)/MCL (ref 0.6–4.6)
LYMPHOCYTES NFR BLD AUTO: 29.6 %
MCH RBC QN AUTO: 25.7 PG (ref 27–31)
MCHC RBC AUTO-ENTMCNC: 32.5 G/DL (ref 33–36)
MCV RBC AUTO: 79.1 FL (ref 80–94)
MONOCYTES # BLD AUTO: 0.53 X10(3)/MCL (ref 0.1–1.3)
MONOCYTES NFR BLD AUTO: 7.1 %
NEUTROPHILS # BLD AUTO: 4.58 X10(3)/MCL (ref 2.1–9.2)
NEUTROPHILS NFR BLD AUTO: 61.7 %
NRBC BLD AUTO-RTO: 0 %
PLATELET # BLD AUTO: 157 X10(3)/MCL (ref 130–400)
PLATELETS.RETICULATED NFR BLD AUTO: 15.9 % (ref 0.9–11.2)
PMV BLD AUTO: 12.9 FL (ref 7.4–10.4)
POTASSIUM SERPL-SCNC: 4.5 MMOL/L (ref 3.5–5.1)
PROT SERPL-MCNC: 5.8 GM/DL (ref 6.4–8.3)
RBC # BLD AUTO: 4.36 X10(6)/MCL (ref 4.2–5.4)
SODIUM SERPL-SCNC: 138 MMOL/L (ref 136–145)
SPECIMEN OUTDATE: NORMAL
T PALLIDUM AB SER QL: NONREACTIVE
WBC # SPEC AUTO: 7.43 X10(3)/MCL (ref 4.5–11.5)

## 2024-01-03 PROCEDURE — 63600175 PHARM REV CODE 636 W HCPCS: Mod: JZ,JG | Performed by: ANESTHESIOLOGY

## 2024-01-03 PROCEDURE — 25000003 PHARM REV CODE 250: Performed by: STUDENT IN AN ORGANIZED HEALTH CARE EDUCATION/TRAINING PROGRAM

## 2024-01-03 PROCEDURE — 63600175 PHARM REV CODE 636 W HCPCS: Performed by: STUDENT IN AN ORGANIZED HEALTH CARE EDUCATION/TRAINING PROGRAM

## 2024-01-03 PROCEDURE — 85025 COMPLETE CBC W/AUTO DIFF WBC: CPT | Performed by: STUDENT IN AN ORGANIZED HEALTH CARE EDUCATION/TRAINING PROGRAM

## 2024-01-03 PROCEDURE — 86850 RBC ANTIBODY SCREEN: CPT | Performed by: STUDENT IN AN ORGANIZED HEALTH CARE EDUCATION/TRAINING PROGRAM

## 2024-01-03 PROCEDURE — 11000001 HC ACUTE MED/SURG PRIVATE ROOM

## 2024-01-03 PROCEDURE — 80053 COMPREHEN METABOLIC PANEL: CPT | Performed by: STUDENT IN AN ORGANIZED HEALTH CARE EDUCATION/TRAINING PROGRAM

## 2024-01-03 PROCEDURE — 62326 NJX INTERLAMINAR LMBR/SAC: CPT | Performed by: ANESTHESIOLOGY

## 2024-01-03 PROCEDURE — 86780 TREPONEMA PALLIDUM: CPT | Performed by: STUDENT IN AN ORGANIZED HEALTH CARE EDUCATION/TRAINING PROGRAM

## 2024-01-03 PROCEDURE — 59409 OBSTETRICAL CARE: CPT | Mod: AA,,, | Performed by: ANESTHESIOLOGY

## 2024-01-03 PROCEDURE — 25000003 PHARM REV CODE 250

## 2024-01-03 PROCEDURE — 25000003 PHARM REV CODE 250: Performed by: ANESTHESIOLOGY

## 2024-01-03 RX ORDER — CALCIUM CARBONATE 200(500)MG
500 TABLET,CHEWABLE ORAL 3 TIMES DAILY PRN
Status: DISCONTINUED | OUTPATIENT
Start: 2024-01-03 | End: 2024-01-06 | Stop reason: HOSPADM

## 2024-01-03 RX ORDER — SODIUM CHLORIDE, SODIUM LACTATE, POTASSIUM CHLORIDE, CALCIUM CHLORIDE 600; 310; 30; 20 MG/100ML; MG/100ML; MG/100ML; MG/100ML
INJECTION, SOLUTION INTRAVENOUS CONTINUOUS
Status: DISCONTINUED | OUTPATIENT
Start: 2024-01-03 | End: 2024-01-04

## 2024-01-03 RX ORDER — NALBUPHINE HYDROCHLORIDE 10 MG/ML
2.5 INJECTION, SOLUTION INTRAMUSCULAR; INTRAVENOUS; SUBCUTANEOUS ONCE
Status: DISCONTINUED | OUTPATIENT
Start: 2024-01-04 | End: 2024-01-06 | Stop reason: HOSPADM

## 2024-01-03 RX ORDER — SIMETHICONE 80 MG
1 TABLET,CHEWABLE ORAL 4 TIMES DAILY PRN
Status: DISCONTINUED | OUTPATIENT
Start: 2024-01-03 | End: 2024-01-04

## 2024-01-03 RX ORDER — OXYTOCIN/RINGER'S LACTATE 30/500 ML
0-30 PLASTIC BAG, INJECTION (ML) INTRAVENOUS CONTINUOUS
Status: DISCONTINUED | OUTPATIENT
Start: 2024-01-03 | End: 2024-01-06 | Stop reason: HOSPADM

## 2024-01-03 RX ORDER — ONDANSETRON 4 MG/1
8 TABLET, ORALLY DISINTEGRATING ORAL EVERY 8 HOURS PRN
Status: DISCONTINUED | OUTPATIENT
Start: 2024-01-03 | End: 2024-01-04

## 2024-01-03 RX ORDER — DIPHENOXYLATE HYDROCHLORIDE AND ATROPINE SULFATE 2.5; .025 MG/1; MG/1
2 TABLET ORAL EVERY 6 HOURS PRN
Status: DISCONTINUED | OUTPATIENT
Start: 2024-01-03 | End: 2024-01-04

## 2024-01-03 RX ORDER — LIDOCAINE HYDROCHLORIDE 10 MG/ML
10 INJECTION INFILTRATION; PERINEURAL ONCE AS NEEDED
Status: DISCONTINUED | OUTPATIENT
Start: 2024-01-03 | End: 2024-01-06 | Stop reason: HOSPADM

## 2024-01-03 RX ORDER — OXYTOCIN/RINGER'S LACTATE 30/500 ML
95 PLASTIC BAG, INJECTION (ML) INTRAVENOUS ONCE
Status: DISCONTINUED | OUTPATIENT
Start: 2024-01-03 | End: 2024-01-06 | Stop reason: HOSPADM

## 2024-01-03 RX ORDER — OXYTOCIN 10 [USP'U]/ML
10 INJECTION, SOLUTION INTRAMUSCULAR; INTRAVENOUS ONCE AS NEEDED
Status: DISCONTINUED | OUTPATIENT
Start: 2024-01-03 | End: 2024-01-04

## 2024-01-03 RX ORDER — MISOPROSTOL 100 UG/1
800 TABLET ORAL ONCE AS NEEDED
Status: DISCONTINUED | OUTPATIENT
Start: 2024-01-03 | End: 2024-01-06 | Stop reason: HOSPADM

## 2024-01-03 RX ORDER — FAMOTIDINE 10 MG/ML
20 INJECTION INTRAVENOUS ONCE
Status: DISCONTINUED | OUTPATIENT
Start: 2024-01-04 | End: 2024-01-06 | Stop reason: HOSPADM

## 2024-01-03 RX ORDER — PROCHLORPERAZINE EDISYLATE 5 MG/ML
5 INJECTION INTRAMUSCULAR; INTRAVENOUS EVERY 6 HOURS PRN
Status: DISCONTINUED | OUTPATIENT
Start: 2024-01-03 | End: 2024-01-04

## 2024-01-03 RX ORDER — EPHEDRINE SULFATE 50 MG/ML
INJECTION, SOLUTION INTRAVENOUS
Status: COMPLETED
Start: 2024-01-03 | End: 2024-01-03

## 2024-01-03 RX ORDER — OXYTOCIN/RINGER'S LACTATE 30/500 ML
334 PLASTIC BAG, INJECTION (ML) INTRAVENOUS ONCE
Status: DISCONTINUED | OUTPATIENT
Start: 2024-01-03 | End: 2024-01-06 | Stop reason: HOSPADM

## 2024-01-03 RX ORDER — METHYLERGONOVINE MALEATE 0.2 MG/ML
200 INJECTION INTRAVENOUS ONCE AS NEEDED
Status: DISCONTINUED | OUTPATIENT
Start: 2024-01-03 | End: 2024-01-04

## 2024-01-03 RX ORDER — EPHEDRINE SULFATE 50 MG/ML
10 INJECTION, SOLUTION INTRAVENOUS ONCE AS NEEDED
Status: COMPLETED | OUTPATIENT
Start: 2024-01-03 | End: 2024-01-03

## 2024-01-03 RX ORDER — FENTANYL/BUPIVACAINE/NS/PF 2-1250MCG
PLASTIC BAG, INJECTION (ML) INJECTION CONTINUOUS
Status: DISCONTINUED | OUTPATIENT
Start: 2024-01-04 | End: 2024-01-06 | Stop reason: HOSPADM

## 2024-01-03 RX ORDER — SODIUM CITRATE AND CITRIC ACID MONOHYDRATE 334; 500 MG/5ML; MG/5ML
30 SOLUTION ORAL ONCE
Status: DISCONTINUED | OUTPATIENT
Start: 2024-01-04 | End: 2024-01-06 | Stop reason: HOSPADM

## 2024-01-03 RX ORDER — BUPIVACAINE HYDROCHLORIDE 2.5 MG/ML
INJECTION, SOLUTION EPIDURAL; INFILTRATION; INTRACAUDAL
Status: COMPLETED | OUTPATIENT
Start: 2024-01-03 | End: 2024-01-03

## 2024-01-03 RX ORDER — OXYTOCIN/RINGER'S LACTATE 30/500 ML
334 PLASTIC BAG, INJECTION (ML) INTRAVENOUS ONCE AS NEEDED
Status: DISCONTINUED | OUTPATIENT
Start: 2024-01-03 | End: 2024-01-04

## 2024-01-03 RX ORDER — CARBOPROST TROMETHAMINE 250 UG/ML
250 INJECTION, SOLUTION INTRAMUSCULAR
Status: DISCONTINUED | OUTPATIENT
Start: 2024-01-03 | End: 2024-01-04

## 2024-01-03 RX ORDER — OXYTOCIN/RINGER'S LACTATE 30/500 ML
95 PLASTIC BAG, INJECTION (ML) INTRAVENOUS ONCE AS NEEDED
Status: DISCONTINUED | OUTPATIENT
Start: 2024-01-03 | End: 2024-01-04

## 2024-01-03 RX ADMIN — ONDANSETRON 8 MG: 4 TABLET, ORALLY DISINTEGRATING ORAL at 09:01

## 2024-01-03 RX ADMIN — EPHEDRINE SULFATE 10 MG: 50 INJECTION INTRAVENOUS at 11:01

## 2024-01-03 RX ADMIN — SODIUM CHLORIDE, POTASSIUM CHLORIDE, SODIUM LACTATE AND CALCIUM CHLORIDE 1000 ML: 600; 310; 30; 20 INJECTION, SOLUTION INTRAVENOUS at 09:01

## 2024-01-03 RX ADMIN — EPHEDRINE SULFATE 10 MG: 50 INJECTION, SOLUTION INTRAVENOUS at 11:01

## 2024-01-03 RX ADMIN — BUPIVACAINE HYDROCHLORIDE 10 ML: 2.5 INJECTION, SOLUTION EPIDURAL; INFILTRATION; INTRACAUDAL; PERINEURAL at 10:01

## 2024-01-03 RX ADMIN — SODIUM CHLORIDE, POTASSIUM CHLORIDE, SODIUM LACTATE AND CALCIUM CHLORIDE: 600; 310; 30; 20 INJECTION, SOLUTION INTRAVENOUS at 11:01

## 2024-01-03 RX ADMIN — Medication 2 MILLI-UNITS/MIN: at 03:01

## 2024-01-03 RX ADMIN — Medication 12 ML/HR: at 10:01

## 2024-01-03 NOTE — H&P
L&D - History & Physical    Chief Complaint: IOL     HPI:      Tressa Sheppard is a 28 y.o.  at 37w4d who presents today for evaluation of above chief complaint.    Pt nervous but otherwise well.      No chief complaint on file.       Patient's last menstrual period was 04/15/2023.     OB History    Para Term  AB Living   1             SAB IAB Ectopic Multiple Live Births                  # Outcome Date GA Lbr Tee/2nd Weight Sex Delivery Anes PTL Lv   1 Current                Past Medical History:   Diagnosis Date    Anxiety and depression     Bipolar disorder, unspecified     Dyspareunia     Gestational hypertension affecting fifth pregnancy        Past Surgical History:   Procedure Laterality Date    CHOLECYSTECTOMY      ESOPHAGEAL MOTILITY STUDY USING HIGH RESOLUTION MANOMETRY N/A 2023    Procedure: 730am MOTILITY w/ RAPID SWALLOW;  Surgeon: Florin Ferraro MD;  Location: [facility] ENDOSCOPY;  Service: Gastroenterology;  Laterality: N/A;  Scheduled for 730    TONSILLECTOMY AND ADENOIDECTOMY         Family History   Problem Relation Age of Onset    Diabetes Maternal Grandfather     Breast cancer Maternal Grandmother     Breast cancer Maternal Aunt        Social History     Socioeconomic History    Marital status:    Tobacco Use    Smoking status: Never    Smokeless tobacco: Never   Substance and Sexual Activity    Alcohol use: Not Currently    Drug use: Never    Sexual activity: Yes     Partners: Male     Social Determinants of Health     Financial Resource Strain: Medium Risk (1/3/2024)    Overall Financial Resource Strain (CARDIA)     Difficulty of Paying Living Expenses: Somewhat hard   Food Insecurity: No Food Insecurity (1/3/2024)    Hunger Vital Sign     Worried About Running Out of Food in the Last Year: Never true     Ran Out of Food in the Last Year: Never true   Transportation Needs: No Transportation Needs (1/3/2024)    PRAPARE - Transportation     Lack of Transportation  (Medical): No     Lack of Transportation (Non-Medical): No   Physical Activity: Inactive (1/3/2024)    Exercise Vital Sign     Days of Exercise per Week: 0 days     Minutes of Exercise per Session: 0 min   Stress: Stress Concern Present (1/3/2024)    [employer] [place] [employer] - Occupational Stress Questionnaire     Feeling of Stress : Very much   Social Connections: Unknown (1/3/2024)    Social Connection and Isolation Panel [NHANES]     Frequency of Communication with Friends and Family: More than three times a week     Frequency of Social Gatherings with Friends and Family: Three times a week     Active Member of Clubs or Organizations: No     Attends Club or Organization Meetings: Patient declined     Marital Status:    Housing Stability: Low Risk  (1/3/2024)    Housing Stability Vital Sign     Unable to Pay for Housing in the Last Year: No     Number of Places Lived in the Last Year: 1     Unstable Housing in the Last Year: No       Current Facility-Administered Medications   Medication Dose Route Frequency Provider Last Rate Last Admin    calcium carbonate 200 mg calcium (500 mg) chewable tablet 500 mg  500 mg Oral TID PRN Negro Geiger MD        carboprost injection 250 mcg  250 mcg Intramuscular Q15 Min PRN Negro Geiger MD        diphenoxylate-atropine 2.5-0.025 mg per tablet 2 tablet  2 tablet Oral Q6H PRNegro Hankins MD        lactated ringers bolus 1,000 mL  1,000 mL Intravenous PRN Negro Geiger MD        lactated ringers bolus 500 mL  500 mL Intravenous PRN Negro Geiger MD        lactated ringers infusion   Intravenous Continuous Negro Geiger MD        LIDOcaine HCL 10 mg/ml (1%) injection 10 mL  10 mL Intradermal Once PRN Negro Geiger MD        methylergonovine injection 200 mcg  200 mcg Intramuscular Once PRN Negro Geiger MD        miSOPROStoL tablet 800 mcg  800 mcg Rectal Once PRN Negro Geiger MD        miSOPROStoL tablet 800 mcg  800 mcg Oral Once PRN  "Negro Geiger MD        ondansetron disintegrating tablet 8 mg  8 mg Oral Q8H PRN Negro Geiger MD        oxytocin 30 units in 500 mL lactated ringers infusion (non-titrating)  334 pernell-units/min Intravenous Once Negro Geiger MD        oxytocin 30 units in 500 mL lactated ringers infusion (non-titrating)  95 pernell-units/min Intravenous Once Negro Geiger MD        oxytocin 30 units in 500 mL lactated ringers infusion (non-titrating)  334 pernell-units/min Intravenous Once PRN Negro Geiger MD        oxytocin 30 units in 500 mL lactated ringers infusion (non-titrating)  95 pernell-units/min Intravenous Once PRN Negro Geiger MD        oxytocin 30 units in 500 mL lactated ringers infusion (titrating)  0-30 pernell-units/min Intravenous Continuous Negro Geiger MD 2 mL/hr at 01/03/24 1520 2 pernell-units/min at 01/03/24 1520    oxytocin injection 10 Units  10 Units Intramuscular Once PRN Negro Geiger MD        prochlorperazine injection Soln 5 mg  5 mg Intravenous Q6H PRN Negro Geiger MD        simethicone chewable tablet 80 mg  1 tablet Oral QID PRN Negro Geiger MD        tranexamic acid (CYKLOKAPRON) 1,000 mg in sodium chloride 0.9 % 100 mL IVPB (MB+)  1,000 mg Intravenous Q30 Min PRN Negro Geiger MD           Review of patient's allergies indicates:   Allergen Reactions    Amoxicillin Rash     As a baby         Physical Exam:      Ht 5' 8" (1.727 m)   Wt 118.8 kg (262 lb)   LMP 04/15/2023   Breastfeeding No   BMI 39.84 kg/m²   Body mass index is 39.84 kg/m².     APPEARANCE: Well nourished, well developed, in no acute distress.  PSYCH: Appropriate mood and affect.  CHEST: Normal respiratory effort,  CV: Normal capillary refill.  ABDOMEN: Soft.  No tenderness or masses.    PELVIC: 3/70/-3, declined AROM  EXTREMITIES: No edema       Results:     Results for orders placed or performed during the hospital encounter of 01/03/24   SYPHILIS ANTIBODY (WITH REFLEX RPR)   Result Value Ref Range    " Syphilis Antibody Nonreactive Nonreactive, Equivocal   CBC with Differential   Result Value Ref Range    WBC 7.43 4.50 - 11.50 x10(3)/mcL    RBC 4.36 4.20 - 5.40 x10(6)/mcL    Hgb 11.2 (L) 12.0 - 16.0 g/dL    Hct 34.5 (L) 37.0 - 47.0 %    MCV 79.1 (L) 80.0 - 94.0 fL    MCH 25.7 (L) 27.0 - 31.0 pg    MCHC 32.5 (L) 33.0 - 36.0 g/dL    RDW 13.7 11.5 - 17.0 %    Platelet 157 130 - 400 x10(3)/mcL    MPV 12.9 (H) 7.4 - 10.4 fL    Neut % 61.7 %    Lymph % 29.6 %    Mono % 7.1 %    Eos % 0.4 %    Basophil % 0.4 %    Lymph # 2.20 0.6 - 4.6 x10(3)/mcL    Neut # 4.58 2.1 - 9.2 x10(3)/mcL    Mono # 0.53 0.1 - 1.3 x10(3)/mcL    Eos # 0.03 0 - 0.9 x10(3)/mcL    Baso # 0.03 <=0.2 x10(3)/mcL    IG# 0.06 (H) 0 - 0.04 x10(3)/mcL    IG% 0.8 %    NRBC% 0.0 %    IPF 15.9 (H) 0.9 - 11.2 %   Comprehensive Metabolic Panel   Result Value Ref Range    Sodium Level 138 136 - 145 mmol/L    Potassium Level 4.5 3.5 - 5.1 mmol/L    Chloride 109 (H) 98 - 107 mmol/L    Carbon Dioxide 21 (L) 22 - 29 mmol/L    Glucose Level 76 74 - 100 mg/dL    Blood Urea Nitrogen 10.3 7.0 - 18.7 mg/dL    Creatinine 0.72 0.55 - 1.02 mg/dL    Calcium Level Total 8.6 8.4 - 10.2 mg/dL    Protein Total 5.8 (L) 6.4 - 8.3 gm/dL    Albumin Level 2.8 (L) 3.5 - 5.0 g/dL    Globulin 3.0 2.4 - 3.5 gm/dL    Albumin/Globulin Ratio 0.9 (L) 1.1 - 2.0 ratio    Bilirubin Total 0.4 <=1.5 mg/dL    Alkaline Phosphatase 179 (H) 40 - 150 unit/L    Alanine Aminotransferase 15 0 - 55 unit/L    Aspartate Aminotransferase 20 5 - 34 unit/L    eGFR >60 mls/min/1.73/m2   Type & Screen   Result Value Ref Range    Group & Rh O POS     Indirect Jacobo GEL NEG     Specimen Outdate 01/06/2024 23:59          Assessment/Plan:     Active Problem List with Overview Notes    Diagnosis Date Noted    Intrahepatic cholestasis of pregnancy in third trimester 12/21/2023     BA 16, plan delivery between 37-38w      Gestational hypertension, third trimester 12/18/2023     Based on 139/91 on adalat 60 nightly  (for esophageal spasms)  Plan twice weekly NST and labs weekly      Benign gestational thrombocytopenia in third trimester 2023    Elevated glucose 10/25/2023      > GTT wnl      Hyperemesis affecting pregnancy, antepartum 2023     lost 36lb, now regained 10lb  Much improved over last 2 weeks   Zofran reglan benadryl (only needing 2x weekly now)  Has seen Dr Nguyễn in the past with abnormal esophageal motility? Referral now  H pylori testing negative previously      Supervision of high risk pregnancy in second trimester 2023     OB: [facility] (s/p [facility] at 20w after seeing 3 other OB clinics (labs only, no MD visits))  Datinst trimester   Labs: wnl in media  GBS:  TOD: currently no indication for prior to 39w  MOD: no current indication for CS  MOF:  Baby: boy!   FOB: supportive at NOB  Prenatals at home   27w4d EFW 1278g(70%)AC72%, MARION 13  35w2d EFW 2875g(63%)AC60%, MARION 15       Bipolar disorder, unspecified 2023     Never manic per pt  Many meds in chart, on none now  Sad and depressed with HG  Now getting better  Has cousleing q 1-2 weeks  Declines meds  23 - opted to start something. Will try buspar 5 BID and atarax PRN      Dyspareunia in female 2023     PFPT      BMI at NOB 35-39 2023     Serial growths  Testing starting at 37w  Early GCT wnl per pt        Continue IOL for gHTN, IHCP, and dFM    Negro Geiger MD  2024 4:03 PM

## 2024-01-04 PROCEDURE — 3E033VJ INTRODUCTION OF OTHER HORMONE INTO PERIPHERAL VEIN, PERCUTANEOUS APPROACH: ICD-10-PCS | Performed by: STUDENT IN AN ORGANIZED HEALTH CARE EDUCATION/TRAINING PROGRAM

## 2024-01-04 PROCEDURE — 25000003 PHARM REV CODE 250: Performed by: STUDENT IN AN ORGANIZED HEALTH CARE EDUCATION/TRAINING PROGRAM

## 2024-01-04 PROCEDURE — 72200005 HC VAGINAL DELIVERY LEVEL II

## 2024-01-04 PROCEDURE — 10907ZC DRAINAGE OF AMNIOTIC FLUID, THERAPEUTIC FROM PRODUCTS OF CONCEPTION, VIA NATURAL OR ARTIFICIAL OPENING: ICD-10-PCS | Performed by: STUDENT IN AN ORGANIZED HEALTH CARE EDUCATION/TRAINING PROGRAM

## 2024-01-04 PROCEDURE — 51702 INSERT TEMP BLADDER CATH: CPT

## 2024-01-04 PROCEDURE — 25000003 PHARM REV CODE 250: Performed by: ANESTHESIOLOGY

## 2024-01-04 PROCEDURE — 11000001 HC ACUTE MED/SURG PRIVATE ROOM

## 2024-01-04 PROCEDURE — 72100002 HC LABOR CARE, 1ST 8 HOURS

## 2024-01-04 PROCEDURE — 25000003 PHARM REV CODE 250

## 2024-01-04 PROCEDURE — 72100003 HC LABOR CARE, EA. ADDL. 8 HRS

## 2024-01-04 PROCEDURE — 0KQM0ZZ REPAIR PERINEUM MUSCLE, OPEN APPROACH: ICD-10-PCS | Performed by: STUDENT IN AN ORGANIZED HEALTH CARE EDUCATION/TRAINING PROGRAM

## 2024-01-04 RX ORDER — OXYTOCIN/RINGER'S LACTATE 30/500 ML
334 PLASTIC BAG, INJECTION (ML) INTRAVENOUS ONCE AS NEEDED
Status: DISCONTINUED | OUTPATIENT
Start: 2024-01-04 | End: 2024-01-04

## 2024-01-04 RX ORDER — EPHEDRINE SULFATE 50 MG/ML
10 INJECTION, SOLUTION INTRAVENOUS ONCE
Status: COMPLETED | OUTPATIENT
Start: 2024-01-04 | End: 2024-01-04

## 2024-01-04 RX ORDER — IBUPROFEN 600 MG/1
600 TABLET ORAL EVERY 6 HOURS
Status: DISCONTINUED | OUTPATIENT
Start: 2024-01-04 | End: 2024-01-04

## 2024-01-04 RX ORDER — HYDROCORTISONE 25 MG/G
CREAM TOPICAL 3 TIMES DAILY PRN
Status: DISCONTINUED | OUTPATIENT
Start: 2024-01-04 | End: 2024-01-06 | Stop reason: HOSPADM

## 2024-01-04 RX ORDER — MISOPROSTOL 100 UG/1
800 TABLET ORAL ONCE AS NEEDED
Status: DISCONTINUED | OUTPATIENT
Start: 2024-01-04 | End: 2024-01-04

## 2024-01-04 RX ORDER — ADHESIVE BANDAGE
30 BANDAGE TOPICAL NIGHTLY PRN
Status: DISCONTINUED | OUTPATIENT
Start: 2024-01-04 | End: 2024-01-06 | Stop reason: HOSPADM

## 2024-01-04 RX ORDER — PRENATAL WITH FERROUS FUM AND FOLIC ACID 3080; 920; 120; 400; 22; 1.84; 3; 20; 10; 1; 12; 200; 27; 25; 2 [IU]/1; [IU]/1; MG/1; [IU]/1; MG/1; MG/1; MG/1; MG/1; MG/1; MG/1; UG/1; MG/1; MG/1; MG/1; MG/1
1 TABLET ORAL DAILY
Status: DISCONTINUED | OUTPATIENT
Start: 2024-01-04 | End: 2024-01-06 | Stop reason: HOSPADM

## 2024-01-04 RX ORDER — METHYLERGONOVINE MALEATE 0.2 MG/ML
200 INJECTION INTRAVENOUS ONCE AS NEEDED
Status: DISCONTINUED | OUTPATIENT
Start: 2024-01-04 | End: 2024-01-04

## 2024-01-04 RX ORDER — ADHESIVE BANDAGE
30 BANDAGE TOPICAL NIGHTLY PRN
Status: DISCONTINUED | OUTPATIENT
Start: 2024-01-04 | End: 2024-01-04

## 2024-01-04 RX ORDER — CARBOPROST TROMETHAMINE 250 UG/ML
250 INJECTION, SOLUTION INTRAMUSCULAR
Status: DISCONTINUED | OUTPATIENT
Start: 2024-01-04 | End: 2024-01-06 | Stop reason: HOSPADM

## 2024-01-04 RX ORDER — ACETAMINOPHEN 500 MG
1000 TABLET ORAL EVERY 6 HOURS
Status: DISCONTINUED | OUTPATIENT
Start: 2024-01-04 | End: 2024-01-04

## 2024-01-04 RX ORDER — DOCUSATE SODIUM 100 MG/1
200 CAPSULE, LIQUID FILLED ORAL 2 TIMES DAILY
Status: DISCONTINUED | OUTPATIENT
Start: 2024-01-04 | End: 2024-01-04

## 2024-01-04 RX ORDER — MAG HYDROX/ALUMINUM HYD/SIMETH 200-200-20
30 SUSPENSION, ORAL (FINAL DOSE FORM) ORAL EVERY 6 HOURS PRN
Status: DISCONTINUED | OUTPATIENT
Start: 2024-01-04 | End: 2024-01-06 | Stop reason: HOSPADM

## 2024-01-04 RX ORDER — ONDANSETRON 4 MG/1
8 TABLET, ORALLY DISINTEGRATING ORAL EVERY 8 HOURS PRN
Status: DISCONTINUED | OUTPATIENT
Start: 2024-01-04 | End: 2024-01-06 | Stop reason: HOSPADM

## 2024-01-04 RX ORDER — SIMETHICONE 80 MG
1 TABLET,CHEWABLE ORAL EVERY 6 HOURS PRN
Status: DISCONTINUED | OUTPATIENT
Start: 2024-01-04 | End: 2024-01-04

## 2024-01-04 RX ORDER — OXYTOCIN/RINGER'S LACTATE 30/500 ML
95 PLASTIC BAG, INJECTION (ML) INTRAVENOUS ONCE AS NEEDED
Status: DISCONTINUED | OUTPATIENT
Start: 2024-01-04 | End: 2024-01-04

## 2024-01-04 RX ORDER — METHYLERGONOVINE MALEATE 0.2 MG/ML
200 INJECTION INTRAVENOUS ONCE AS NEEDED
Status: DISCONTINUED | OUTPATIENT
Start: 2024-01-04 | End: 2024-01-06 | Stop reason: HOSPADM

## 2024-01-04 RX ORDER — PROCHLORPERAZINE EDISYLATE 5 MG/ML
5 INJECTION INTRAMUSCULAR; INTRAVENOUS EVERY 6 HOURS PRN
Status: DISCONTINUED | OUTPATIENT
Start: 2024-01-04 | End: 2024-01-06 | Stop reason: HOSPADM

## 2024-01-04 RX ORDER — DIPHENHYDRAMINE HCL 25 MG
25 CAPSULE ORAL EVERY 4 HOURS PRN
Status: DISCONTINUED | OUTPATIENT
Start: 2024-01-04 | End: 2024-01-04

## 2024-01-04 RX ORDER — EPHEDRINE SULFATE 50 MG/ML
INJECTION, SOLUTION INTRAVENOUS
Status: COMPLETED
Start: 2024-01-04 | End: 2024-01-04

## 2024-01-04 RX ORDER — HYDROCORTISONE 25 MG/G
CREAM TOPICAL 3 TIMES DAILY PRN
Status: DISCONTINUED | OUTPATIENT
Start: 2024-01-04 | End: 2024-01-04

## 2024-01-04 RX ORDER — CARBOPROST TROMETHAMINE 250 UG/ML
250 INJECTION, SOLUTION INTRAMUSCULAR
Status: DISCONTINUED | OUTPATIENT
Start: 2024-01-04 | End: 2024-01-04

## 2024-01-04 RX ORDER — OXYTOCIN/RINGER'S LACTATE 30/500 ML
95 PLASTIC BAG, INJECTION (ML) INTRAVENOUS ONCE
Status: DISCONTINUED | OUTPATIENT
Start: 2024-01-04 | End: 2024-01-04

## 2024-01-04 RX ORDER — DIPHENOXYLATE HYDROCHLORIDE AND ATROPINE SULFATE 2.5; .025 MG/1; MG/1
2 TABLET ORAL EVERY 6 HOURS PRN
Status: DISCONTINUED | OUTPATIENT
Start: 2024-01-04 | End: 2024-01-04

## 2024-01-04 RX ORDER — DOCUSATE SODIUM 100 MG/1
200 CAPSULE, LIQUID FILLED ORAL 2 TIMES DAILY
Status: DISCONTINUED | OUTPATIENT
Start: 2024-01-04 | End: 2024-01-06 | Stop reason: HOSPADM

## 2024-01-04 RX ORDER — DIPHENOXYLATE HYDROCHLORIDE AND ATROPINE SULFATE 2.5; .025 MG/1; MG/1
2 TABLET ORAL EVERY 6 HOURS PRN
Status: DISCONTINUED | OUTPATIENT
Start: 2024-01-04 | End: 2024-01-06 | Stop reason: HOSPADM

## 2024-01-04 RX ORDER — OXYCODONE HYDROCHLORIDE 5 MG/1
5 TABLET ORAL EVERY 4 HOURS PRN
Status: DISCONTINUED | OUTPATIENT
Start: 2024-01-04 | End: 2024-01-04

## 2024-01-04 RX ORDER — MAG HYDROX/ALUMINUM HYD/SIMETH 200-200-20
30 SUSPENSION, ORAL (FINAL DOSE FORM) ORAL EVERY 6 HOURS PRN
Status: DISCONTINUED | OUTPATIENT
Start: 2024-01-04 | End: 2024-01-04

## 2024-01-04 RX ORDER — OXYTOCIN 10 [USP'U]/ML
10 INJECTION, SOLUTION INTRAMUSCULAR; INTRAVENOUS ONCE AS NEEDED
Status: DISCONTINUED | OUTPATIENT
Start: 2024-01-04 | End: 2024-01-04

## 2024-01-04 RX ORDER — ACETAMINOPHEN 500 MG
1000 TABLET ORAL EVERY 6 HOURS
Status: DISCONTINUED | OUTPATIENT
Start: 2024-01-04 | End: 2024-01-06 | Stop reason: HOSPADM

## 2024-01-04 RX ORDER — PRENATAL WITH FERROUS FUM AND FOLIC ACID 3080; 920; 120; 400; 22; 1.84; 3; 20; 10; 1; 12; 200; 27; 25; 2 [IU]/1; [IU]/1; MG/1; [IU]/1; MG/1; MG/1; MG/1; MG/1; MG/1; MG/1; UG/1; MG/1; MG/1; MG/1; MG/1
1 TABLET ORAL DAILY
Status: DISCONTINUED | OUTPATIENT
Start: 2024-01-04 | End: 2024-01-04

## 2024-01-04 RX ORDER — IBUPROFEN 600 MG/1
600 TABLET ORAL EVERY 6 HOURS
Status: DISCONTINUED | OUTPATIENT
Start: 2024-01-04 | End: 2024-01-06 | Stop reason: HOSPADM

## 2024-01-04 RX ORDER — DIPHENHYDRAMINE HYDROCHLORIDE 50 MG/ML
25 INJECTION, SOLUTION INTRAMUSCULAR; INTRAVENOUS EVERY 4 HOURS PRN
Status: DISCONTINUED | OUTPATIENT
Start: 2024-01-04 | End: 2024-01-06 | Stop reason: HOSPADM

## 2024-01-04 RX ORDER — OXYCODONE HYDROCHLORIDE 5 MG/1
5 TABLET ORAL EVERY 4 HOURS PRN
Status: DISCONTINUED | OUTPATIENT
Start: 2024-01-04 | End: 2024-01-06 | Stop reason: HOSPADM

## 2024-01-04 RX ORDER — SIMETHICONE 80 MG
1 TABLET,CHEWABLE ORAL EVERY 6 HOURS PRN
Status: DISCONTINUED | OUTPATIENT
Start: 2024-01-04 | End: 2024-01-06 | Stop reason: HOSPADM

## 2024-01-04 RX ORDER — FACIAL-BODY WIPES
10 EACH TOPICAL DAILY PRN
Status: DISCONTINUED | OUTPATIENT
Start: 2024-01-04 | End: 2024-01-04

## 2024-01-04 RX ORDER — FACIAL-BODY WIPES
10 EACH TOPICAL DAILY PRN
Status: DISCONTINUED | OUTPATIENT
Start: 2024-01-04 | End: 2024-01-06 | Stop reason: HOSPADM

## 2024-01-04 RX ORDER — DIPHENHYDRAMINE HCL 25 MG
25 CAPSULE ORAL EVERY 4 HOURS PRN
Status: DISCONTINUED | OUTPATIENT
Start: 2024-01-04 | End: 2024-01-06 | Stop reason: HOSPADM

## 2024-01-04 RX ORDER — PROCHLORPERAZINE EDISYLATE 5 MG/ML
5 INJECTION INTRAMUSCULAR; INTRAVENOUS EVERY 6 HOURS PRN
Status: DISCONTINUED | OUTPATIENT
Start: 2024-01-04 | End: 2024-01-04

## 2024-01-04 RX ORDER — ONDANSETRON 4 MG/1
8 TABLET, ORALLY DISINTEGRATING ORAL EVERY 8 HOURS PRN
Status: DISCONTINUED | OUTPATIENT
Start: 2024-01-04 | End: 2024-01-04

## 2024-01-04 RX ORDER — DIPHENHYDRAMINE HYDROCHLORIDE 50 MG/ML
25 INJECTION, SOLUTION INTRAMUSCULAR; INTRAVENOUS EVERY 4 HOURS PRN
Status: DISCONTINUED | OUTPATIENT
Start: 2024-01-04 | End: 2024-01-04

## 2024-01-04 RX ORDER — OXYTOCIN 10 [USP'U]/ML
10 INJECTION, SOLUTION INTRAMUSCULAR; INTRAVENOUS ONCE AS NEEDED
Status: DISCONTINUED | OUTPATIENT
Start: 2024-01-04 | End: 2024-01-06 | Stop reason: HOSPADM

## 2024-01-04 RX ADMIN — MAGNESIUM HYDROXIDE 2400 MG: 400 SUSPENSION ORAL at 11:01

## 2024-01-04 RX ADMIN — ACETAMINOPHEN 825 MG: 325 TABLET, FILM COATED ORAL at 11:01

## 2024-01-04 RX ADMIN — IBUPROFEN 600 MG: 600 TABLET, FILM COATED ORAL at 07:01

## 2024-01-04 RX ADMIN — EPHEDRINE SULFATE 10 MG: 50 INJECTION, SOLUTION INTRAVENOUS at 12:01

## 2024-01-04 RX ADMIN — IBUPROFEN 600 MG: 600 TABLET, FILM COATED ORAL at 02:01

## 2024-01-04 RX ADMIN — BENZOCAINE AND LEVOMENTHOL: 200; 5 SPRAY TOPICAL at 08:01

## 2024-01-04 RX ADMIN — EPHEDRINE SULFATE 10 MG: 50 INJECTION INTRAVENOUS at 12:01

## 2024-01-04 RX ADMIN — ONDANSETRON 8 MG: 4 TABLET, ORALLY DISINTEGRATING ORAL at 05:01

## 2024-01-04 RX ADMIN — ACETAMINOPHEN 1000 MG: 325 TABLET, FILM COATED ORAL at 05:01

## 2024-01-04 RX ADMIN — Medication 12 ML/HR: at 03:01

## 2024-01-04 RX ADMIN — IBUPROFEN 600 MG: 600 TABLET, FILM COATED ORAL at 08:01

## 2024-01-04 NOTE — ANESTHESIA PREPROCEDURE EVALUATION
01/03/2024  Tressa Sheppard is a 28 y.o., female.      Pre-op Assessment    I have reviewed the Patient Summary Reports.     I have reviewed the Nursing Notes. I have reviewed the NPO Status.   I have reviewed the Medications.     Review of Systems  Anesthesia Hx:  No problems with previous Anesthesia                Hematology/Oncology:  Hematology Normal   Oncology Normal                                   EENT/Dental:  EENT/Dental Normal           Cardiovascular:     Hypertension                                        Pulmonary:  Pulmonary Normal                       Renal/:  Renal/ Normal                 Hepatic/GI:      Liver Disease,            Neurological:  Neurology Normal                                      Endocrine:  Endocrine Normal            Dermatological:  Skin Normal    Psych:  Psychiatric History                Physical Exam  General: Well nourished, Cooperative, Alert and Oriented    Airway:  Mallampati: II   Mouth Opening: Normal  TM Distance: Normal  Tongue: Normal  Neck ROM: Normal ROM    Dental:  Intact    Chest/Lungs:  Clear to auscultation    Heart:  Rate: Normal    Anesthesia Plan  Type of Anesthesia, risks & benefits discussed:    Anesthesia Type: Epidural  Intra-op Monitoring Plan: Standard ASA Monitors  Post Op Pain Control Plan: multimodal analgesia  Informed Consent: Informed consent signed with the Patient and all parties understand the risks and agree with anesthesia plan.  All questions answered.   ASA Score: 2  Day of Surgery Review of History & Physical: H&P Update referred to the surgeon/provider.I have interviewed and examined the patient. I have reviewed the patient's H&P dated:     Ready For Surgery From Anesthesia Perspective.   .

## 2024-01-04 NOTE — NURSING
Pt assisted up to the bathroom after delivery. Unable to void, darlin care performed and pads applied, steady gait noted.

## 2024-01-04 NOTE — L&D DELIVERY NOTE
Physician Delivery Note    28 y.o. year old  at 37w5d    Pregnancy Complication:    Patient Active Problem List    Diagnosis Date Noted    Intrahepatic cholestasis of pregnancy in third trimester 2023    Gestational hypertension, third trimester 2023    Benign gestational thrombocytopenia in third trimester 2023    Elevated glucose 10/25/2023    Hyperemesis affecting pregnancy, antepartum 2023    Supervision of high risk pregnancy in second trimester 2023    Bipolar disorder, unspecified 2023    Dyspareunia in female 2023    BMI at NOB 35-39 2023       Labor:  IOL    Anesthesia:  epidural    Episiotomy/Laceration/Repair:  2nd degree repaired with 2-0 vicryl on a CT    Delivery:       Position:  OA    Placenta:  spontaneous    Complications:  none    Comments:    Baby OA. Double nuchal delivered through. Anterior and posterior shoulders atraumatically delivered with gentle traction, and the FOB delivered the trunk and LE. Infant vigorous with spontaneous cry. Baby placed on mother's chest for skin to skin contact. Delayed cord clamping was performed for 60 seconds. Cord doubly clamped and cut.  Cord blood collected. Placenta delivered by controlled cord traction intact. Normal uterine tone with oxytocin infusion.  Rectum and vagina clear of sponges. Placenta discarded. [place] and patient stable in the delivery room with nursing present.    Negro Geiger MD  2024 7:01 AM

## 2024-01-04 NOTE — PLAN OF CARE
Problem: Adult Inpatient Plan of Care  Goal: Plan of Care Review  Outcome: Ongoing, Progressing  Goal: Patient-Specific Goal (Individualized)  Outcome: Ongoing, Progressing  Goal: Absence of Hospital-Acquired Illness or Injury  Outcome: Ongoing, Progressing  Goal: Optimal Comfort and Wellbeing  Outcome: Ongoing, Progressing  Goal: Readiness for Transition of Care  Outcome: Ongoing, Progressing     Problem: Bariatric Environmental Safety  Goal: Safety Maintained with Care  Outcome: Ongoing, Progressing     Problem: Infection  Goal: Absence of Infection Signs and Symptoms  Outcome: Ongoing, Progressing     Problem:  Fall Injury Risk  Goal: Absence of Fall, Infant Drop and Related Injury  Outcome: Ongoing, Progressing     Problem: Bleeding (Labor)  Goal: Hemostasis  Outcome: Ongoing, Progressing     Problem: Change in Fetal Wellbeing (Labor)  Goal: Stable Fetal Wellbeing  Outcome: Ongoing, Progressing     Problem: Delayed Labor Progression (Labor)  Goal: Effective Progression to Delivery  Outcome: Ongoing, Progressing     Problem: Infection (Labor)  Goal: Absence of Infection Signs and Symptoms  Outcome: Ongoing, Progressing     Problem: Labor Pain (Labor)  Goal: Acceptable Pain Control  Outcome: Ongoing, Progressing     Problem: Uterine Tachysystole (Labor)  Goal: Normal Uterine Contraction Pattern  Outcome: Ongoing, Progressing

## 2024-01-04 NOTE — ANESTHESIA PROCEDURE NOTES
Epidural    Patient location during procedure: OB   Reason for block: primary anesthetic   Reason for block: labor analgesia requested by patient and obstetrician  Diagnosis: labor   Start time: 1/3/2024 10:48 PM  Timeout: 1/3/2024 10:48 PM  End time: 1/3/2024 10:48 PM    Staffing  Performing Provider: Leonel Anderson MD  Authorizing Provider: Leonel Anderson MD    Staffing  Performed by: Leonel Anderson MD  Authorized by: Leonel Anderson MD        Preanesthetic Checklist  Completed: patient identified, IV checked, site marked, risks and benefits discussed, surgical consent, monitors and equipment checked, pre-op evaluation, timeout performed, anesthesia consent given, hand hygiene performed and patient being monitored  Preparation  Patient position: sitting  Prep: ChloraPrep  Patient monitoring: ECG and Blood Pressure  Reason for block: primary anesthetic   Epidural  Skin Anesthetic: lidocaine 1%  Skin Wheal: 2 mL  Administration type: continuous  Approach: midline  Interspace: L2-3    Injection technique: VIJAY saline  Needle and Epidural Catheter  Needle type: Tuohy   Needle gauge: 17  Needle length: 3.5 inches  Catheter type: springwIcecreamlabs  Catheter size: 19 G  Insertion Attempts: 1  Test dose: 5 mL of lidocaine 1.5% with Epi 1-to-200,000  Additional Documentation: incremental injection, negative aspiration for heme and CSF, no paresthesia on injection, no signs/symptoms of IV or SA injection, no significant complaints from patient and no significant pain on injection  Needle localization: anatomical landmarks  Assessment  Upper dermatomal levels - Left: T12  Right: T12   Dermatomal levels determined by alcohol wipe  Ease of block: easy  Patient's tolerance of the procedure: comfortable throughout block and no complaints No inadvertent dural puncture with Tuohy.  Dural puncture not performed with spinal needle    Medications:    Medications: bupivacaine (pf) (MARCAINE) injection 0.25% - Epidural   10 mL  - 1/3/2024 10:49:00 PM

## 2024-01-05 PROCEDURE — 11000001 HC ACUTE MED/SURG PRIVATE ROOM

## 2024-01-05 PROCEDURE — 25000003 PHARM REV CODE 250: Performed by: STUDENT IN AN ORGANIZED HEALTH CARE EDUCATION/TRAINING PROGRAM

## 2024-01-05 RX ADMIN — IBUPROFEN 600 MG: 600 TABLET, FILM COATED ORAL at 11:01

## 2024-01-05 RX ADMIN — IBUPROFEN 600 MG: 600 TABLET, FILM COATED ORAL at 01:01

## 2024-01-05 RX ADMIN — IBUPROFEN 600 MG: 600 TABLET, FILM COATED ORAL at 06:01

## 2024-01-05 RX ADMIN — DOCUSATE SODIUM 200 MG: 100 CAPSULE, LIQUID FILLED ORAL at 11:01

## 2024-01-05 RX ADMIN — DOCUSATE SODIUM 200 MG: 100 CAPSULE, LIQUID FILLED ORAL at 01:01

## 2024-01-05 NOTE — PLAN OF CARE
Problem: Adult Inpatient Plan of Care  Goal: Plan of Care Review  Outcome: Ongoing, Progressing  Goal: Patient-Specific Goal (Individualized)  Outcome: Ongoing, Progressing  Goal: Absence of Hospital-Acquired Illness or Injury  Outcome: Ongoing, Progressing  Goal: Optimal Comfort and Wellbeing  Outcome: Ongoing, Progressing  Goal: Readiness for Transition of Care  Outcome: Ongoing, Progressing     Problem: Bariatric Environmental Safety  Goal: Safety Maintained with Care  Outcome: Ongoing, Progressing     Problem: Infection  Goal: Absence of Infection Signs and Symptoms  Outcome: Ongoing, Progressing     Problem:  Fall Injury Risk  Goal: Absence of Fall, Infant Drop and Related Injury  Outcome: Ongoing, Progressing

## 2024-01-05 NOTE — NURSING
Patient scored a 26 on edinburgh. Pt reports history of bipolar, depression and anxiety and states she is seeing a therapist weekly and will continue to. She anticipated having a hard time postpartum and stated she started her medications up again one month prior to delivering in hopes that they would be effective by the time the baby came. Pt states that she has help with the baby until he is 2 years of age. Father states he is working 2 minutes awaay from home and will be home everyday at lunch to help. Both individuals report knowing the signs to watch for and state they have all the help they need. Patient was notified of policy for high edinburgh and is ok with everything needed to be done. MD notified and hugh was called. Helena olmstead NP is on call

## 2024-01-05 NOTE — ANESTHESIA POSTPROCEDURE EVALUATION
Anesthesia Post Evaluation    Patient: Tressa Sheppard    Procedure(s) Performed: * No procedures listed *    Final Anesthesia Type: epidural      Patient location during evaluation: labor & delivery  Patient participation: Yes- Able to Participate  Level of consciousness: awake and alert  Post-procedure vital signs: reviewed and stable  Pain management: adequate  Airway patency: patent  ANTONIETTA mitigation strategies: Multimodal analgesia  PONV status at discharge: No PONV  Anesthetic complications: no      Cardiovascular status: blood pressure returned to baseline and hemodynamically stable  Respiratory status: unassisted  Hydration status: euvolemic  Follow-up not needed.          Vitals Value Taken Time   /86 01/04/24 1642   Temp 36.6 °C (97.9 °F) 01/04/24 1642   Pulse 79 01/04/24 1642   Resp 20 01/04/24 0655   SpO2 100 % 01/04/24 0650         No case tracking events are documented in the log.      Pain/Donita Score: Pain Rating Prior to Med Admin: 5 (1/4/2024  5:32 PM)

## 2024-01-05 NOTE — LACTATION NOTE
This note was copied from a baby's chart.  Mom says baby is sleepy and not latching. Assisted with waking baby, attempted latch. Baby only latched briefly, and quickly became sleepy. Mom was assisted with pumping and hand expression. Baby was fed 3mls of colostrum. Baby had just gotten 1.7mls about an hour ago.    Encouraged mom to continue ot pump every 3 hours and feed baby the milk obtained. Can offer breast too, but not to leave baby at the breast longer than 5-10 minutes before moving on to pumping if not latching.     Answered moms questions. Offered further assistance if needed. Verbalized understanding of all.

## 2024-01-05 NOTE — PROGRESS NOTES
PostPartum Progress Note        Subjective:      Postpartum Day #1 after   .  Patient is without complaints. Lochia decreasing, less than menses.  Pain is well controlled. Patient is ambulating without lightheadedness. Tolerating regular diet.    Objective:      Temp:  [97.8 °F (36.6 °C)-97.9 °F (36.6 °C)] 97.8 °F (36.6 °C)  Pulse:  [73-79] 79  BP: (113-131)/(72-86) 113/72  No intake or output data in the 24 hours ending 24 1125  Body mass index is 39.84 kg/m².    General: no acute distress  Abdomen: soft, appropriately tender  Extremities: non-tender, symmetric    Group & Rh   Date Value Ref Range Status   2024 O POS  Final     Recent Results (from the past 336 hour(s))   CBC with Differential    Collection Time: 24 11:54 AM   Result Value Ref Range    WBC 7.43 4.50 - 11.50 x10(3)/mcL    Hgb 11.2 (L) 12.0 - 16.0 g/dL    Hct 34.5 (L) 37.0 - 47.0 %    Platelet 157 130 - 400 x10(3)/mcL   CBC with Differential    Collection Time: 23  8:54 AM   Result Value Ref Range    WBC 8.74 4.50 - 11.50 x10(3)/mcL    Hgb 11.7 (L) 12.0 - 16.0 g/dL    Hct 35.9 (L) 37.0 - 47.0 %    Platelet 169 130 - 400 x10(3)/mcL          Assessment/Plan     28 y.o.  S/P , PPD # 1 - Doing Well   -Continue routine postpartum care  -Anticipated d/c PPD#2    Active Problem List with Overview Notes    Diagnosis Date Noted    Intrahepatic cholestasis of pregnancy in third trimester 2023     BA 16, plan delivery between 37-38w      Gestational hypertension, third trimester 2023     Based on 139/91 on adalat 60 nightly (for esophageal spasms)  Plan twice weekly NST and labs weekly      Benign gestational thrombocytopenia in third trimester 2023    Elevated glucose 10/25/2023      > GTT wnl      Hyperemesis affecting pregnancy, antepartum 2023     lost 36lb, now regained 10lb  Much improved over last 2 weeks   Zofran reglan benadryl (only needing 2x weekly now)  Has seen Dr Nguyễn in the  past with abnormal esophageal motility? Referral now  H pylori testing negative previously      Supervision of high risk pregnancy in second trimester 2023     OB: [facility] (s/p [facility] at 20w after seeing 3 other OB clinics (labs only, no MD visits))  Datinst trimester   Labs: wnl in media  GBS:  TOD: currently no indication for prior to 39w  MOD: no current indication for CS  MOF:  Baby: boy!   FOB: supportive at NO  Prenatals at home   27w4d EFW 1278g(70%)AC72%, MARION 13  35w2d EFW 2875g(63%)AC60%, MAROIN 15       Bipolar disorder, unspecified 2023     Never manic per pt  Many meds in chart, on none now  Sad and depressed with HG  Now getting better  Has cousleing q 1-2 weeks  Declines meds  23 - opted to start something. Will try buspar 5 BID and atarax PRN      Dyspareunia in female 2023     PFPT      BMI at NOB 35-39 2023     Serial growths  Testing starting at 37w  Early GCT wnl per pt             Negro Geiger MD  2024 11:25 AM

## 2024-01-05 NOTE — PLAN OF CARE
Problem: Breastfeeding  Goal: Effective Breastfeeding  Intervention: Promote Effective Breastfeeding  Flowsheets (Taken 1/5/2024 0615)  Breastfeeding Assistance: support offered  Parent/Child Attachment Promotion: strengths emphasized  Intervention: Support Exclusive Breastfeeding Success  Flowsheets (Taken 1/5/2024 0615)  Supportive Measures:   active listening utilized   counseling provided   verbalization of feelings encouraged  Breastfeeding Support:   encouragement provided   lactation counseling provided

## 2024-01-05 NOTE — PROGRESS NOTES
Inpatient Nutrition Evaluation    Admit Date: 1/3/2024   Total duration of encounter: 2 days   Patient Age: 28 y.o.    Nutrition Recommendation/Prescription     Continue Regular diet as tolerated    Nutrition Assessment     Chart Review    Reason Seen: malnutrition screening tool (MST)    Malnutrition Screening Tool Results   Have you recently lost weight without trying?: Yes: 2-13 lbs  Have you been eating poorly because of a decreased appetite?: Yes   MST Score: 2   Diagnosis:  S/P , PPD # 1     Relevant Medical History:    Anxiety and depression      Bipolar disorder, unspecified      Dyspareunia      Gestational hypertension affecting fifth pregnancy        Scheduled Medications:  acetaminophen, 1,000 mg, Q6H  docusate sodium, 200 mg, BID  famotidine (PF), 20 mg, Once  ibuprofen, 600 mg, Q6H  nalbuphine, 2.5 mg, Once  oxytocin in lactated ringers, 334 pernell-units/min, Once  oxytocin in lactated ringers, 95 pernell-units/min, Once  prenatal vitamin, 1 tablet, Daily  sodium citrate-citric acid 500-334 mg/5 ml, 30 mL, Once    Continuous Infusions:  benzocaine-lanolin  fentanyl 2 mcg/mL with BUPivacaine 0.125% in sodium chloride 0.9% Epidural, Last Rate: 12 mL/hr (24 0340)  oxytocin in lactated ringers, Last Rate: 3 pernell-units/min (24 0034)    PRN Medications: aluminum-magnesium hydroxide-simethicone, benzocaine-lanolin, bisacodyL, calcium carbonate, carboprost, diphenhydrAMINE, diphenhydrAMINE, diphenoxylate-atropine 2.5-0.025 mg, hydrocortisone, lanolin, LIDOcaine HCL 10 mg/ml (1%), magnesium hydroxide 400 mg/5 ml, measles, mumps and rubella vaccine, methylergonovine, miSOPROStoL, miSOPROStoL, ondansetron, oxyCODONE, oxytocin, prochlorperazine, simethicone, DIPH,PERTUSS(ACELL),TET VACCINE (ADULT)(BOOSTRIX,ADACEL), tranexamic acid (CYKLOKAPRON) infusion    Recent Labs   Lab 24  1154 24  1156   NA  --  138   K  --  4.5   CALCIUM  --  8.6   CHLORIDE  --  109*   CO2  --  21*   BUN  --  10.3  "  CREATININE  --  0.72   EGFRNORACEVR  --  >60   GLUCOSE  --  76   BILITOT  --  0.4   ALKPHOS  --  179*   ALT  --  15   AST  --  20   ALBUMIN  --  2.8*   WBC 7.43  --    HGB 11.2*  --    HCT 34.5*  --      Nutrition Orders:  Diet Adult Regular      Appetite/Oral Intake: good/% of meals  Factors Affecting Nutritional Intake: none identified  Food/Hindu/Cultural Preferences: unable to obtain  Food Allergies: no known food allergies     Wound(s):  none noted    Comments    1/5/24:  Pt seen for MST score of 2.  Pt with hx of wt loss in pregnancy associated with hyperemesis and cholestasis.  Pt had since regained wt during pregnancy with anti-emetic med regimen.  Diet advanced to Regular postpartum. Pt tolerating.      Anthropometrics    Height: 5' 8" (172.7 cm), Height Method: Stated  Last Weight: 118.8 kg (262 lb) (01/03/24 1203), Weight Method: Stated  BMI (Calculated): 39.8  BMI Classification: obese grade II (BMI 35-39.9)     Ideal Body Weight (IBW), Female: 140 lb     % Ideal Body Weight, Female (lb): 187.14 %                             Usual Weight Provided By: unable to obtain usual weight    Wt Readings from Last 5 Encounters:   01/03/24 118.8 kg (262 lb)   01/03/24 121.2 kg (267 lb 4.9 oz)   12/28/23 121.3 kg (267 lb 6.7 oz)   12/21/23 118.9 kg (262 lb 2 oz)   11/29/23 118.6 kg (261 lb 7.5 oz)     Weight Change(s) Since Admission: 1/5: no new wts  Wt Readings from Last 1 Encounters:   01/03/24 1203 118.8 kg (262 lb)   Admit Weight: 118.8 kg (262 lb) (01/03/24 1203), Weight Method: Stated      Patient Education     Not applicable.    Nutrition Goals & Monitoring     Dietitian will monitor: food and beverage intake and weight    Nutrition Risk/Follow-Up: low (follow-up in 5-7 days)  Patients assigned 'low nutrition risk' status do not qualify for a full nutritional assessment but will be monitored and re-evaluated in a 5-7 day time period. Please consult if re-evaluation needed sooner.   "

## 2024-01-06 VITALS
DIASTOLIC BLOOD PRESSURE: 85 MMHG | HEIGHT: 68 IN | TEMPERATURE: 98 F | BODY MASS INDEX: 39.71 KG/M2 | WEIGHT: 262 LBS | SYSTOLIC BLOOD PRESSURE: 132 MMHG | HEART RATE: 93 BPM | OXYGEN SATURATION: 97 % | RESPIRATION RATE: 20 BRPM

## 2024-01-06 PROCEDURE — 25000003 PHARM REV CODE 250: Performed by: STUDENT IN AN ORGANIZED HEALTH CARE EDUCATION/TRAINING PROGRAM

## 2024-01-06 RX ORDER — BUSPIRONE HYDROCHLORIDE 5 MG/1
10 TABLET ORAL 2 TIMES DAILY
Status: DISCONTINUED | OUTPATIENT
Start: 2024-01-06 | End: 2024-01-06

## 2024-01-06 RX ORDER — BUSPIRONE HYDROCHLORIDE 5 MG/1
10 TABLET ORAL 2 TIMES DAILY
Status: DISCONTINUED | OUTPATIENT
Start: 2024-01-06 | End: 2024-01-06 | Stop reason: HOSPADM

## 2024-01-06 RX ORDER — BUSPIRONE HYDROCHLORIDE 10 MG/1
10 TABLET ORAL 2 TIMES DAILY
Qty: 60 TABLET | Refills: 2 | Status: SHIPPED | OUTPATIENT
Start: 2024-01-06 | End: 2024-04-05

## 2024-01-06 RX ADMIN — IBUPROFEN 600 MG: 600 TABLET, FILM COATED ORAL at 06:01

## 2024-01-06 RX ADMIN — DOCUSATE SODIUM 200 MG: 100 CAPSULE, LIQUID FILLED ORAL at 10:01

## 2024-01-06 RX ADMIN — MAGNESIUM HYDROXIDE 2400 MG: 400 SUSPENSION ORAL at 03:01

## 2024-01-06 RX ADMIN — IBUPROFEN 600 MG: 600 TABLET, FILM COATED ORAL at 01:01

## 2024-01-06 RX ADMIN — BUSPIRONE HYDROCHLORIDE 10 MG: 5 TABLET ORAL at 10:01

## 2024-01-06 RX ADMIN — BENZOCAINE AND LEVOMENTHOL: 200; 5 SPRAY TOPICAL at 03:01

## 2024-01-06 NOTE — PLAN OF CARE
Problem: Adult Inpatient Plan of Care  Goal: Plan of Care Review  Outcome: Ongoing, Progressing  Goal: Patient-Specific Goal (Individualized)  Outcome: Ongoing, Progressing  Goal: Absence of Hospital-Acquired Illness or Injury  Outcome: Ongoing, Progressing  Goal: Optimal Comfort and Wellbeing  Outcome: Ongoing, Progressing  Goal: Readiness for Transition of Care  Outcome: Ongoing, Progressing     Problem: Bariatric Environmental Safety  Goal: Safety Maintained with Care  Outcome: Ongoing, Progressing     Problem: Infection  Goal: Absence of Infection Signs and Symptoms  Outcome: Ongoing, Progressing     Problem:  Fall Injury Risk  Goal: Absence of Fall, Infant Drop and Related Injury  Outcome: Ongoing, Progressing     Problem: Bleeding (Labor)  Goal: Hemostasis  Outcome: Ongoing, Progressing     Problem: Change in Fetal Wellbeing (Labor)  Goal: Stable Fetal Wellbeing  Outcome: Ongoing, Progressing     Problem: Delayed Labor Progression (Labor)  Goal: Effective Progression to Delivery  Outcome: Ongoing, Progressing     Problem: Infection (Labor)  Goal: Absence of Infection Signs and Symptoms  Outcome: Ongoing, Progressing     Problem: Labor Pain (Labor)  Goal: Acceptable Pain Control  Outcome: Ongoing, Progressing     Problem: Uterine Tachysystole (Labor)  Goal: Normal Uterine Contraction Pattern  Outcome: Ongoing, Progressing     Problem: Breastfeeding  Goal: Effective Breastfeeding  Outcome: Ongoing, Progressing

## 2024-01-06 NOTE — PROGRESS NOTES
PostPartum Progress Note        Subjective:      Postpartum Day #2 after   .  Patient is without complaints. Lochia decreasing, less than menses.  Pain is well controlled. Patient is ambulating without lightheadedness. Tolerating regular diet.    Mood is at baseline EPDS was >20, so hospital policy requires to be seen by psych prior to DC. Pt denies HI/SI    Objective:      Temp:  [97.6 °F (36.4 °C)-97.9 °F (36.6 °C)] 97.9 °F (36.6 °C)  Pulse:  [75-90] 75  SpO2:  [97 %] 97 %  BP: (108-126)/(72-85) 126/85  No intake or output data in the 24 hours ending 24 0934  Body mass index is 39.84 kg/m².    General: no acute distress  Abdomen: soft, appropriately tender  Extremities: non-tender, symmetric    Group & Rh   Date Value Ref Range Status   2024 O POS  Final     Recent Results (from the past 336 hour(s))   CBC with Differential    Collection Time: 24 11:54 AM   Result Value Ref Range    WBC 7.43 4.50 - 11.50 x10(3)/mcL    Hgb 11.2 (L) 12.0 - 16.0 g/dL    Hct 34.5 (L) 37.0 - 47.0 %    Platelet 157 130 - 400 x10(3)/mcL   CBC with Differential    Collection Time: 23  8:54 AM   Result Value Ref Range    WBC 8.74 4.50 - 11.50 x10(3)/mcL    Hgb 11.7 (L) 12.0 - 16.0 g/dL    Hct 35.9 (L) 37.0 - 47.0 %    Platelet 169 130 - 400 x10(3)/mcL          Assessment/Plan     28 y.o.  S/P , PPD # 2 - Doing Well   -Continue routine postpartum care  -Anticipated d/c when seen by psych    Active Problem List with Overview Notes    Diagnosis Date Noted    Intrahepatic cholestasis of pregnancy in third trimester 2023     BA 16, plan delivery between 37-38w      Gestational hypertension, third trimester 2023     Based on 139/91 on adalat 60 nightly (for esophageal spasms)  Plan twice weekly NST and labs weekly      Benign gestational thrombocytopenia in third trimester 2023    Elevated glucose 10/25/2023      > GTT wnl      Hyperemesis affecting pregnancy, antepartum 2023      lost 36lb, now regained 10lb  Much improved over last 2 weeks   Zofran reglan benadryl (only needing 2x weekly now)  Has seen Dr Nguyễn in the past with abnormal esophageal motility? Referral now  H pylori testing negative previously      Supervision of high risk pregnancy in second trimester 2023     OB: [facility] (s/p [facility] at 20w after seeing 3 other OB clinics (labs only, no MD visits))  Datinst trimester   Labs: wnl in media  GBS:  TOD: currently no indication for prior to 39w  MOD: no current indication for CS  MOF:  Baby: boy!   FOB: supportive at NOB  Prenatals at home   27w4d EFW 1278g(70%)AC72%, MARION 13  35w2d EFW 2875g(63%)AC60%, MARION 15       Bipolar disorder, unspecified 2023     Never manic per pt  Many meds in chart, on none now  Sad and depressed with HG  Now getting better  Has cousleing q 1-2 weeks  Declines meds  23 - opted to start something. Will try buspar 5 BID and atarax PRN      Dyspareunia in female 2023     PFPT      BMI at NOB 35-39 2023     Serial growths  Testing starting at 37w  Early GCT wnl per pt             Negro Geiger MD  2024 11:25 AM
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
## 2024-01-07 NOTE — DISCHARGE SUMMARY
"Delivery Discharge Summary  Obstetrics      Primary OB Clinician: Negro Geiger MD    Discharge Provider: Negro Geiger MD    Admission date: 1/3/2024  Discharge date: 2024  6:30 PM    Admit Dx:  Patient Active Problem List   Diagnosis    Hyperemesis affecting pregnancy, antepartum    Supervision of high risk pregnancy in second trimester    Bipolar disorder, unspecified    Dyspareunia in female    BMI at NOB 35-39    Elevated glucose    Benign gestational thrombocytopenia in third trimester    Gestational hypertension, third trimester    Intrahepatic cholestasis of pregnancy in third trimester        Discharge Dx:    Same    Procedure: vaginal delivery    Hospital Course:  Tressa Sheppard is a 28 y.o. now  who was admitted on 1/3/2024 for delivery. Patient delivered a viable . Please see delivery note for further details. Pt was in stable condition post delivery and was transferred to the Mother-Baby Unit. Her postpartum course was uncomplicated. On the date of discharge, patient's pain is controlled with oral pain medications. She is tolerating ambulation without SOB or CP, and PO diet without N/V. Reported lochia is within the normal range. Pt in stable condition and ready for discharge.     Pertinent studies:  Postpartum CBC  Lab Results   Component Value Date    WBC 7.43 2024    HGB 11.2 (L) 2024    HCT 34.5 (L) 2024    MCV 79.1 (L) 2024     2024       Delivery:    Episiotomy: None   Lacerations: 2nd   Repair suture:     Repair # of packets: 1   Blood loss (ml):       Birth information:  YOB: 2022   Time of birth: 6:44 AM   Sex: male   Delivery type: Vaginal, Spontaneous   Gestational Age: 37w5d     Measurements    Weight: 3232 g  Weight (lbs): 7 lb 2 oz  Length: 50.8 cm  Length (in): 20"  Head circumference: 35.5 cm         Delivery Clinician: Delivery Providers    Delivering clinician: Negro Geiger MD   Provider Role    Ayaz Mejía RN " Registered Nurse    Lindsay Monzon, RN Registered Nurse    Candy Mondragon LPN Nurse             Additional  information:  Forceps:    Vacuum:    Breech:    Observed anomalies      Living?:     Apgars    Living status: Living  Apgar Component Scores:  1 min.:  5 min.:  10 min.:  15 min.:  20 min.:    Skin color:  1  1       Heart rate:  2  2       Reflex irritability:  2  2       Muscle tone:  2  2       Respiratory effort:  2  2       Total:  9  9       Apgars assigned by: GINNA MONDRAGON LPN         Placenta: Delivered:       appearance    Disposition: To home, self care    Follow Up: 6 weeks    Patient Instructions:   1. Call the office for any bleeding >2 pads/hour for >2 hours, temperature >100.4, pain that is uncontrolled with medications, or for any other concerns.  2. Pelvic rest and no tub baths x 6 weeks.

## 2024-01-22 NOTE — H&P
HISTORY AND PHYSICAL                                                OBSTETRICS        Chief Complaint: Labor induction     Subjective:      Tressa Sheppard is a 28 y.o.  female with IUP at 37w5d gestation who presents to L&D for evaluation of HTN.  Patient denies vaginal bleeding, leaking of fluid, or regular contractions.    ACare this pregnancy has been with Dr. Geiger.  See antepartum record for details.      PMHx:   Past Medical History:   Diagnosis Date    Anxiety and depression     Bipolar disorder, unspecified     Dyspareunia     Gestational hypertension affecting fifth pregnancy     Hyperemesis gravidarum        PSHx:   Past Surgical History:   Procedure Laterality Date    CHOLECYSTECTOMY  2020    ESOPHAGEAL MOTILITY STUDY USING HIGH RESOLUTION MANOMETRY N/A 2023    Procedure: 730am MOTILITY w/ RAPID SWALLOW;  Surgeon: Florin Ferraro MD;  Location: [facility] ENDOSCOPY;  Service: Gastroenterology;  Laterality: N/A;  Scheduled for 730    TONSILLECTOMY AND ADENOIDECTOMY         All:   Review of patient's allergies indicates:   Allergen Reactions    Amoxicillin Rash     As a baby       Meds:   No medications prior to admission.       SH:   Social History     Socioeconomic History    Marital status:    Tobacco Use    Smoking status: Never    Smokeless tobacco: Never   Substance and Sexual Activity    Alcohol use: Not Currently    Drug use: Never    Sexual activity: Yes     Partners: Male     Social Determinants of Health     Financial Resource Strain: Medium Risk (1/3/2024)    Overall Financial Resource Strain (CARDIA)     Difficulty of Paying Living Expenses: Somewhat hard   Food Insecurity: No Food Insecurity (1/3/2024)    Hunger Vital Sign     Worried About Running Out of Food in the Last Year: Never true     Ran Out of Food in the Last Year: Never true   Transportation Needs: No Transportation Needs (1/3/2024)    PRAPARE - Transportation     Lack of Transportation (Medical): No      Lack of Transportation (Non-Medical): No   Physical Activity: Inactive (1/3/2024)    Exercise Vital Sign     Days of Exercise per Week: 0 days     Minutes of Exercise per Session: 0 min   Stress: Stress Concern Present (1/3/2024)    [employer] [place] [employer] - Occupational Stress Questionnaire     Feeling of Stress : Very much   Social Connections: Unknown (1/3/2024)    Social Connection and Isolation Panel [NHANES]     Frequency of Communication with Friends and Family: More than three times a week     Frequency of Social Gatherings with Friends and Family: Three times a week     Active Member of Clubs or Organizations: No     Attends Club or Organization Meetings: Patient declined     Marital Status:    Housing Stability: Low Risk  (1/3/2024)    Housing Stability Vital Sign     Unable to Pay for Housing in the Last Year: No     Number of Places Lived in the Last Year: 1     Unstable Housing in the Last Year: No       FH:   Family History   Problem Relation Age of Onset    Diabetes Maternal Grandfather     Breast cancer Maternal Grandmother     Breast cancer Maternal Aunt        OBHx:   OB History    Para Term  AB Living   1 1 1 0 0 1   SAB IAB Ectopic Multiple Live Births   0 0 0 0 1      # Outcome Date GA Lbr Tee/2nd Weight Sex Delivery Anes PTL Lv   1 Term 24 37w5d 14:40 / 00:44 3.232 kg (7 lb 2 oz) M Vag-Spont EPI N RAY      Name: MARTIN SUAREZ      Apgar1: 9  Apgar5: 9       Objective:      [unfilled]  [unfilled]  BMI Readings from Last 1 Encounters:   24 39.84 kg/m²         General:   alert and cooperative   HEENT:  normocephalic, atraumatic   Lungs:   Normal work of breathing   Heart:   Normal cap refill   Abdomen:  gravid, non-tender   Extremities non-tender, no edema   Derm: no rashes or lesions   Psych: appropriate mood and affect   Pelvis:  adequate                            Vertex presentation by palpation    Lab Review  Prenatal  Labs:  Lab Results   Component Value Date    GROUPTRH O POS 2024    INDCOGEL NEG 2024    HGB 11.2 (L) 2024    HCT 34.5 (L) 2024     2024    RUBELLAIMMUN Non-Immune 2023    HEPBSAG Negative 2023    RPR Non Reactive 10/23/2023    LABURIN No Significant Growth 2023    STREPBCULT Negative 2023        Assessment:     28 y.o.  at 36w1d c/o HTN, here for evaluation    Plan:     Serial BPs  Fetal monitoring (NST)  Pre-e labs

## 2024-02-15 PROBLEM — O21.0 HYPEREMESIS AFFECTING PREGNANCY, ANTEPARTUM: Status: RESOLVED | Noted: 2023-08-30 | Resolved: 2024-02-15

## 2024-02-15 PROBLEM — O13.3 GESTATIONAL HYPERTENSION, THIRD TRIMESTER: Status: RESOLVED | Noted: 2023-12-18 | Resolved: 2024-02-15

## 2024-02-15 PROBLEM — R73.09 ELEVATED GLUCOSE: Status: RESOLVED | Noted: 2023-10-25 | Resolved: 2024-02-15

## 2024-02-15 PROBLEM — O99.113 BENIGN GESTATIONAL THROMBOCYTOPENIA IN THIRD TRIMESTER: Status: RESOLVED | Noted: 2023-12-06 | Resolved: 2024-02-15

## 2024-02-15 PROBLEM — O09.92 SUPERVISION OF HIGH RISK PREGNANCY IN SECOND TRIMESTER: Status: RESOLVED | Noted: 2023-08-30 | Resolved: 2024-02-15

## 2024-02-15 PROBLEM — D69.6 BENIGN GESTATIONAL THROMBOCYTOPENIA IN THIRD TRIMESTER: Status: RESOLVED | Noted: 2023-12-06 | Resolved: 2024-02-15

## 2024-02-15 PROBLEM — O26.643 INTRAHEPATIC CHOLESTASIS OF PREGNANCY IN THIRD TRIMESTER: Status: RESOLVED | Noted: 2023-12-21 | Resolved: 2024-02-15

## 2024-02-15 PROBLEM — Z01.419 WELL WOMAN EXAM: Status: ACTIVE | Noted: 2024-02-15

## 2024-03-21 PROBLEM — N92.0 MENORRHAGIA WITH REGULAR CYCLE: Status: ACTIVE | Noted: 2024-03-21

## 2024-03-21 PROBLEM — Z01.419 WELL WOMAN EXAM: Status: RESOLVED | Noted: 2024-02-15 | Resolved: 2024-03-21

## 2024-03-21 PROBLEM — E28.2 PCOS (POLYCYSTIC OVARIAN SYNDROME): Status: ACTIVE | Noted: 2024-03-21

## 2024-03-21 PROBLEM — N94.6 DYSMENORRHEA: Status: ACTIVE | Noted: 2024-03-21

## 2024-06-30 ENCOUNTER — PATIENT MESSAGE (OUTPATIENT)
Dept: ADMINISTRATIVE | Facility: OTHER | Age: 29
End: 2024-06-30
Payer: MEDICAID

## 2024-07-01 ENCOUNTER — PATIENT MESSAGE (OUTPATIENT)
Dept: ADMINISTRATIVE | Facility: OTHER | Age: 29
End: 2024-07-01
Payer: MEDICAID

## 2024-07-02 ENCOUNTER — ANESTHESIA EVENT (OUTPATIENT)
Dept: ENDOSCOPY | Facility: HOSPITAL | Age: 29
End: 2024-07-02
Payer: MEDICAID

## 2024-07-02 ENCOUNTER — ANESTHESIA (OUTPATIENT)
Dept: ENDOSCOPY | Facility: HOSPITAL | Age: 29
End: 2024-07-02
Payer: MEDICAID

## 2024-07-02 ENCOUNTER — HOSPITAL ENCOUNTER (OUTPATIENT)
Facility: HOSPITAL | Age: 29
Discharge: HOME | End: 2024-07-02
Attending: INTERNAL MEDICINE | Admitting: INTERNAL MEDICINE
Payer: MEDICAID

## 2024-07-02 DIAGNOSIS — E66.9 OBESITY WITH BODY MASS INDEX 30 OR GREATER: ICD-10-CM

## 2024-07-02 DIAGNOSIS — K22.4 INEFFECTIVE ESOPHAGEAL MOTILITY: ICD-10-CM

## 2024-07-02 DIAGNOSIS — K90.89 BILE SALT-INDUCED DIARRHEA: ICD-10-CM

## 2024-07-02 DIAGNOSIS — K21.9 GERD WITHOUT ESOPHAGITIS: ICD-10-CM

## 2024-07-02 DIAGNOSIS — K58.0 IRRITABLE BOWEL SYNDROME WITH DIARRHEA: ICD-10-CM

## 2024-07-02 DIAGNOSIS — R12 HEARTBURN: ICD-10-CM

## 2024-07-02 DIAGNOSIS — K29.50 MILD CHRONIC GASTRITIS: ICD-10-CM

## 2024-07-02 LAB
B-HCG UR QL: NEGATIVE
CTP QC/QA: YES

## 2024-07-02 PROCEDURE — 81025 URINE PREGNANCY TEST: CPT | Performed by: INTERNAL MEDICINE

## 2024-07-02 PROCEDURE — 88313 SPECIAL STAINS GROUP 2: CPT

## 2024-07-02 PROCEDURE — 88312 SPECIAL STAINS GROUP 1: CPT

## 2024-07-02 PROCEDURE — 91035 G-ESOPH REFLX TST W/ELECTROD: CPT | Mod: TC | Performed by: INTERNAL MEDICINE

## 2024-07-02 PROCEDURE — 37000009 HC ANESTHESIA EA ADD 15 MINS: Performed by: INTERNAL MEDICINE

## 2024-07-02 PROCEDURE — 37000008 HC ANESTHESIA 1ST 15 MINUTES: Performed by: INTERNAL MEDICINE

## 2024-07-02 PROCEDURE — 43239 EGD BIOPSY SINGLE/MULTIPLE: CPT | Mod: 59 | Performed by: INTERNAL MEDICINE

## 2024-07-02 PROCEDURE — 88305 TISSUE EXAM BY PATHOLOGIST: CPT | Performed by: INTERNAL MEDICINE

## 2024-07-02 PROCEDURE — 25000003 PHARM REV CODE 250

## 2024-07-02 PROCEDURE — 27201423 OPTIME MED/SURG SUP & DEVICES STERILE SUPPLY: Performed by: INTERNAL MEDICINE

## 2024-07-02 RX ORDER — SODIUM CHLORIDE, SODIUM LACTATE, POTASSIUM CHLORIDE, CALCIUM CHLORIDE 600; 310; 30; 20 MG/100ML; MG/100ML; MG/100ML; MG/100ML
INJECTION, SOLUTION INTRAVENOUS CONTINUOUS
Status: CANCELLED | OUTPATIENT
Start: 2024-07-02

## 2024-07-02 RX ORDER — ONDANSETRON HYDROCHLORIDE 2 MG/ML
4 INJECTION, SOLUTION INTRAVENOUS ONCE AS NEEDED
OUTPATIENT
Start: 2024-07-02 | End: 2035-11-29

## 2024-07-02 RX ORDER — LIDOCAINE HYDROCHLORIDE 20 MG/ML
INJECTION, SOLUTION EPIDURAL; INFILTRATION; INTRACAUDAL; PERINEURAL
Status: DISCONTINUED | OUTPATIENT
Start: 2024-07-02 | End: 2024-07-02

## 2024-07-02 RX ORDER — LIDOCAINE HYDROCHLORIDE 10 MG/ML
1 INJECTION, SOLUTION EPIDURAL; INFILTRATION; INTRACAUDAL; PERINEURAL ONCE
Status: CANCELLED | OUTPATIENT
Start: 2024-07-02 | End: 2024-07-02

## 2024-07-02 RX ORDER — PROPOFOL 10 MG/ML
VIAL (ML) INTRAVENOUS
Status: DISCONTINUED | OUTPATIENT
Start: 2024-07-02 | End: 2024-07-02

## 2024-07-02 RX ADMIN — LIDOCAINE HYDROCHLORIDE 80 MG: 20 INJECTION, SOLUTION INTRAVENOUS at 12:07

## 2024-07-02 RX ADMIN — Medication 50 MG: at 12:07

## 2024-07-02 RX ADMIN — SODIUM CHLORIDE, SODIUM GLUCONATE, SODIUM ACETATE, POTASSIUM CHLORIDE AND MAGNESIUM CHLORIDE: 526; 502; 368; 37; 30 INJECTION, SOLUTION INTRAVENOUS at 12:07

## 2024-07-02 NOTE — ANESTHESIA PREPROCEDURE EVALUATION
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]      Irritable bowel syndrome with diarrhea [K58.0]      Obesity with body mass index 30 or greater [E66.9]   Location: [facility] ENDO 03 / [facility] ENDOSCOPY   Surgeons: Florin Ferraro MD       Pre-op Assessment          Review of Systems  Social:  Non-Smoker       Cardiovascular:                    Gestational HTN                         Pulmonary:        Sleep Apnea Noncompliant with CPAP               Hepatic/GI:     GERD             Endocrine:        Morbid Obesity / BMI > 40  Psych:   anxiety depression Bipolar D/O               Physical Exam  General: Alert and Oriented  Morbid Obesity  Airway:  Mallampati: I   Mouth Opening: Normal  TM Distance: Normal  Tongue: Large  Neck ROM: Normal ROM    Dental:  Intact    Chest/Lungs:  Normal Respiratory Rate    Heart:  Rate: Normal  Rhythm: Regular Rhythm        Anesthesia Plan  Type of Anesthesia, risks & benefits discussed:    Anesthesia Type: Gen Natural Airway  Intra-op Monitoring Plan: Standard ASA Monitors  Post Op Pain Control Plan: IV/PO Opioids PRN  Induction:  IV  Airway Plan: Direct  Informed Consent: Informed consent signed with the Patient and all parties understand the risks and agree with anesthesia plan.  All questions answered. Patient consented to blood products? No  ASA Score: 2  Day of Surgery Review of History & Physical: H&P Update referred to the surgeon/provider.  Anesthesia Plan Notes: Nasal cannula vs facemask supplemental oxygenation   For patients with ANTONIETTA/obesity,  may consider SuperNoval Nasal CPAP      Ready For Surgery From Anesthesia Perspective.     .

## 2024-07-02 NOTE — H&P
Endoscopy History and Physical    PCP - No, Primary Doctor    Procedure - EGD with BRAVO pH   Sedation: MAC  ASA - per anesthesia  Mallampati - per anesthesia  History of Anesthesia problems - no  Family history Anesthesia problems -  no     HPI:  This is a 29 y.o. female here for evaluation of:     Reflux - yes  Dysphagia - no  Abdominal pain - no  Diarrhea - no    ROS:  Constitutional: No fevers, chills, No weight loss  ENT: No allergies  CV: No chest pain  Pulm: No cough, No shortness of breath  Ophtho: No vision changes  GI: see HPI  Medical History:  has a past medical history of Anxiety and depression, Bipolar disorder, unspecified, Dyspareunia, Gestational hypertension affecting fifth pregnancy, and Hyperemesis gravidarum.    Surgical History:  has a past surgical history that includes Cholecystectomy (11/2020); Esophageal motility study using high resolution manometry (N/A, 01/18/2023); and Tonsillectomy and adenoidectomy.    Family History: family history includes Breast cancer in her maternal aunt and maternal grandmother; Diabetes in her maternal grandfather.. Otherwise no colon cancer, inflammatory bowel disease, or GI malignancies.    Social History:  reports that she has never smoked. She has never used smokeless tobacco. She reports that she does not currently use alcohol. She reports that she does not use drugs.    Review of patient's allergies indicates:   Allergen Reactions    Amoxicillin Rash     As a baby       Medications:   Medications Prior to Admission   Medication Sig Dispense Refill Last Dose    cholecalciferol, vitamin D3, (VITAMIN D3) 25 mcg (1,000 unit) capsule Take 1 capsule (1,000 Units total) by mouth once daily. 90 capsule 3 7/1/2024    docusate sodium (COLACE) 100 MG capsule Take 1 capsule (100 mg total) by mouth 2 (two) times daily. 60 capsule 11 Past Month    famotidine (PEPCID) 40 MG tablet Take 40 mg by mouth every morning.   Past Week at 0800    NIFEdipine (ADALAT CC) 30 MG  "TbSR Take 60 mg by mouth nightly.   Past Week    polyethylene glycol (GLYCOLAX) 17 gram PwPk Take 17 g by mouth 2 (two) times daily as needed for Constipation. 30 packet 2 Past Month    progesterone (PROMETRIUM) 200 MG capsule Place 1 capsule (200 mg total) vaginally nightly. From cycle day 17 (or "peak plus 3") until menses. Continue if you become pregnant. 45 capsule 3 7/1/2024 at 2100    busPIRone (BUSPAR) 10 MG tablet Take 1 tablet (10 mg total) by mouth 2 (two) times daily. 60 tablet 2     tranexamic acid (LYSTEDA) 650 mg tablet Take 2 tablets (1,300 mg total) by mouth 3 (three) times daily. During days of heavy menstrual bleeding. 30 tablet 11 6/25/2024 at 0800    VITAFOL GUMMIES 3.33 mg iron- 0.33 mg Chew Take 3 tablets by mouth nightly.   Unknown       Objective Findings:    Vital Signs: Per nursing notes.    Physical Exam:  General Appearance: Well appearing in no acute distress  Head:   Normocephalic, without obvious abnormality  Eyes:    No scleral icterus  Airway: Open  Neck: No restriction in mobility  Lungs: CTA bilaterally in anterior and posterior fields, no wheezes, no crackles.  Heart:  Regular rate and rhythm, S1, S2 normal, no murmurs heard  Abdomen: Soft, non tender, non distended      Labs:  Lab Results   Component Value Date    WBC 7.43 01/03/2024    HGB 11.2 (L) 01/03/2024    HCT 34.5 (L) 01/03/2024     01/03/2024    CHOL 191 04/08/2024    TRIG 94 04/08/2024    HDL 63 04/08/2024    ALT 15 01/03/2024    AST 20 01/03/2024     01/03/2024    K 4.5 01/03/2024     (H) 01/03/2024    CREATININE 0.72 01/03/2024    BUN 10.3 01/03/2024    CO2 21 (L) 01/03/2024    TSH 1.810 04/08/2024         I have explained the risks and benefits of endoscopy procedures to the patient including but not limited to bleeding, perforation, infection, and death.    Thank you so much for allowing me to participate in the care of Tressa Ferraro MD    "

## 2024-07-02 NOTE — TRANSFER OF CARE
"Anesthesia Transfer of Care Note    Patient: Tressa Sheppard    Procedure(s) Performed: Procedure(s) (LRB):  BRAVO 96 HR (N/A)  BRAVO (N/A)  EGD, WITH CLOSED BIOPSY    Patient location: GI    Anesthesia Type: MAC    Transport from OR: Transported from OR on room air with adequate spontaneous ventilation    Post pain: adequate analgesia    Post assessment: no apparent anesthetic complications    Post vital signs: stable    Level of consciousness: awake, alert and oriented    Nausea/Vomiting: no nausea/vomiting    Complications: none    Transfer of care protocol was followed      Last vitals: Visit Vitals  /80 (BP Location: Left arm, Patient Position: Lying)   Pulse 75   Temp 36 °C (96.8 °F) (Skin)   Resp 18   Ht 5' 7" (1.702 m)   Wt 113.4 kg (250 lb)   SpO2 100%   Breastfeeding No   BMI 39.16 kg/m²     "

## 2024-07-02 NOTE — ANESTHESIA POSTPROCEDURE EVALUATION
Anesthesia Post Evaluation    Patient: Tressa Sheppard    Procedure(s) Performed: Procedure(s) (LRB):  BRAVO 96 HR (N/A)  BRAVO (N/A)  EGD, WITH CLOSED BIOPSY    Final Anesthesia Type: general      Patient location during evaluation: GI PACU  Patient participation: Yes- Able to Participate  Level of consciousness: oriented and awake  Post-procedure vital signs: reviewed and stable  Pain management: adequate  Airway patency: patent    PONV status at discharge: No PONV  Anesthetic complications: no      Cardiovascular status: hemodynamically stable  Respiratory status: spontaneous ventilation and unassisted  Hydration status: euvolemic  Follow-up not needed.  Comments: [facility]              Vitals Value Taken Time   /80 07/02/24 1306   Temp 36 °C (96.8 °F) 07/02/24 1306   Pulse 75 07/02/24 1306   Resp 18 07/02/24 1306   SpO2 100 % 07/02/24 1306         No case tracking events are documented in the log.      Pain/Donita Score: Donita Score: 9 (7/2/2024  1:03 PM)

## 2024-07-02 NOTE — [employer] PATIENT INSTRUCTIONS
Discharge Summary/Instructions after an Endoscopic Procedure  Patient Name: Tressa Sheppard  Patient MRN: [id number]  Patient YOB: 1995  Tuesday, July 2, 2024  Florin Ferraro MD  Dear patient,  As a result of recent federal legislation (The Federal Cures Act), you may   receive lab or pathology results from your procedure in your [facility]   account before your physician is able to contact you. Your physician or   their representative will relay the results to you with their   recommendations at their soonest availability.  Thank you,  RESTRICTIONS:  During your procedure today, you received medications for sedation.  These   medications may affect your judgment, balance and coordination.  Therefore,   for 24 hours, you have the following restrictions:   - DO NOT drive a car, operate machinery, make legal/financial decisions,   sign important papers or drink alcohol.    ACTIVITY:  Today: no heavy lifting, straining or running due to procedural   sedation/anesthesia.  The following day: return to full activity including work.  DIET:  Eat and drink normally unless instructed otherwise.     TREATMENT FOR COMMON SIDE EFFECTS:  - Mild abdominal pain, nausea, belching, bloating or excessive gas:  rest,   eat lightly and use a heating pad.  - Sore Throat: treat with throat lozenges and/or gargle with warm salt   water.  - Because air was used during the procedure, expelling large amounts of air   from your rectum or belching is normal.  - If a bowel prep was taken, you may not have a bowel movement for 1-3 days.    This is normal.  SYMPTOMS TO WATCH FOR AND REPORT TO YOUR PHYSICIAN:  1. Abdominal pain or bloating, other than gas cramps.  2. Chest pain.  3. Back pain.  4. Signs of infection such as: chills or fever occurring within 24 hours   after the procedure.  5. Rectal bleeding, which would show as bright red, maroon, or black stools.   (A tablespoon of blood from the rectum is not serious, especially if    hemorrhoids are present.)  6. Vomiting.  7. Weakness or dizziness.  GO DIRECTLY TO THE NEAREST EMERGENCY ROOM IF YOU HAVE ANY OF THE FOLLOWING:      Difficulty breathing              Chills and/or fever over 101 F   Persistent vomiting and/or vomiting blood   Severe abdominal pain   Severe chest pain   Black, tarry stools   Bleeding- more than one tablespoon   Any other symptom or condition that you feel may need urgent attention  Your doctor recommends these additional instructions:  If any biopsies were taken, your doctors clinic will contact you in 1 to 2   weeks with any results.  - Await pathology results.   - Observe patient's clinical course.   - The findings and recommendations were discussed with the patient.   - Return to nurse practitioner in 2 weeks.   - Discharge patient to home (with spouse).  For questions, problems or results please call your physician - Florin Ferraro MD at Work:  [phone].  [facility], EMERGENCY ROOM PHONE NUMBER: [phone]  IF A COMPLICATION OR EMERGENCY SITUATION ARISES AND YOU ARE UNABLE TO REACH   YOUR PHYSICIAN - GO DIRECTLY TO THE EMERGENCY ROOM.  MD Florin Peralta MD  7/2/2024 1:01:41 PM  This report has been verified and signed electronically.  Dear patient,  As a result of recent federal legislation (The Federal Cures Act), you may   receive lab or pathology results from your procedure in your [facility]   account before your physician is able to contact you. Your physician or   their representative will relay the results to you with their   recommendations at their soonest availability.  Thank you,  [employer]

## 2024-07-02 NOTE — DISCHARGE SUMMARY
[facility] Endoscopy  Discharge Note  Short Stay    Procedure(s) (LRB):  BRAVO 96 HR (N/A)  BRAVO (N/A)  EGD, WITH CLOSED BIOPSY      OUTCOME: Patient tolerated treatment/procedure well without complication and is now ready for discharge.    DISPOSITION: Home or Self Care    FINAL DIAGNOSIS:  GERD     FOLLOWUP: In clinic    DISCHARGE INSTRUCTIONS:  No discharge procedures on file.     TIME SPENT ON DISCHARGE: 15 minutes

## 2024-07-03 VITALS
TEMPERATURE: 97 F | HEART RATE: 69 BPM | HEIGHT: 67 IN | BODY MASS INDEX: 39.24 KG/M2 | WEIGHT: 250 LBS | RESPIRATION RATE: 15 BRPM | OXYGEN SATURATION: 99 % | SYSTOLIC BLOOD PRESSURE: 123 MMHG | DIASTOLIC BLOOD PRESSURE: 73 MMHG

## 2024-07-03 LAB — PSYCHE PATHOLOGY RESULT: NORMAL

## 2024-08-26 DIAGNOSIS — R05.3 CHRONIC COUGH: Primary | ICD-10-CM

## 2024-09-04 NOTE — PROGRESS NOTES
[facility]  Otolaryngology Clinic Note    Tressa Sheppard  YOB: 1995    Chief Complaint:   Chief Complaint   Patient presents with    referral: Chronic Cough        HPI: 09/05/2024: 29 y.o. female referred by PCP for chronic cough. She reports this began 11 months ago when she was pregnant. Denies exacerbating or relieving factors. Endorses nasal congestion, infraorbital pressure, & ear aches. Denies rhinitis, PND, hyposmia, sneezing, or itchy/watery eyes. Reports occasional hearing loss when her allergy or cold symptoms flare. Denies hx of PE tubes or otologic surgery. She is unsure if she had recurrent ear infections. Endorses globus & severe reflux. Never smoker. Reports she has dysphagia r/t esophageal spasms. Occasional dysphonia which spontaneously resolves. States she just had a pH study done and was taken off PPI & put on voquenza. Has used flonase prn when congestion & facial pressure was bad. Recently prescribed xyzal + tessalon perles but has not picked them up from the pharmacy yet. Denies SOB apart from exertional which is stable. She feels this is r/t being overweight but she has struggled to lose weight despite dietary modifications.     ROS:   10-point review of systems negative except per HPI      Review of patient's allergies indicates:   Allergen Reactions    Amoxicillin Rash     As a baby       Past Medical History:   Diagnosis Date    Acne 2016    Anxiety and depression     Bipolar disorder, unspecified     Dyspareunia     GERD (gastroesophageal reflux disease) 2019    Gestational hypertension affecting fifth pregnancy     Hyperemesis gravidarum     Obesity 2010    Seasonal allergies 2013    Sleep apnea 2020    Ulcer 2020       Past Surgical History:   Procedure Laterality Date    CHOLECYSTECTOMY  11/2020    EGD, WITH CLOSED BIOPSY  07/02/2024    Procedure: EGD, WITH CLOSED BIOPSY;  Surgeon: Florin Ferraro MD;  Location: [facility] ENDOSCOPY;  Service:  Gastroenterology;;    ESOPHAGEAL MOTILITY STUDY USING HIGH RESOLUTION MANOMETRY N/A 01/18/2023    Procedure: 730am MOTILITY w/ RAPID SWALLOW;  Surgeon: Florin Ferraro MD;  Location: [facility] ENDOSCOPY;  Service: Gastroenterology;  Laterality: N/A;  Scheduled for 0730    ESOPHAGOGASTRODUODENOSCOPY N/A 07/02/2024    Procedure: BRAVO 96 HR;  Surgeon: Florin Ferraro MD;  Location: [facility] ENDOSCOPY;  Service: Gastroenterology;  Laterality: N/A;  BRAVO placed @ 31cm @ 1253am    INSERTION OF PH PROBE N/A 07/02/2024    Procedure: BRAVO;  Surgeon: Florin Ferraro MD;  Location: [facility] ENDOSCOPY;  Service: Gastroenterology;  Laterality: N/A;    TONSILLECTOMY AND ADENOIDECTOMY         Social History     Socioeconomic History    Marital status:    Tobacco Use    Smoking status: Never    Smokeless tobacco: Never   Substance and Sexual Activity    Alcohol use: Not Currently    Drug use: Never    Sexual activity: Yes     Partners: Male     Birth control/protection: None     Social Determinants of Health     Financial Resource Strain: Medium Risk (4/8/2024)    [email or web address]    Overall Financial Resource Strain (CARDIA)     Difficulty of Paying Living Expenses: Somewhat hard   Food Insecurity: Food Insecurity Present (4/8/2024)    [email or web address], [facility] Subs[facility] and Affiliates    Hunger Vital Sign     Worried About Running Out of Food in the Last Year: Sometimes true     Ran Out of Food in the Last Year: Sometimes true   Transportation Needs: No Transportation Needs (4/8/2024)    [email or web address], [facility]  [facility]    PRAPARE - Transportation     Lack of Transportation (Medical): No     Lack of Transportation (Non-Medical): No   Physical Activity: Insufficiently Active (4/8/2024)    [facility], [facility] and Its [facility] and Affiliates    Exercise Vital Sign     Days of Exercise per Week: 1 day     Minutes of Exercise per Session: 30 min   Stress: Stress Concern Present (4/8/2024)    [facility], [facility]    [employer] [place] [employer] - Occupational Stress Questionnaire     Feeling of Stress : Very much   Housing Stability: Low Risk  (4/8/2024)    [facility], [facility] and [facility] and [facility]    Housing Stability Vital Sign     Unable to Pay for Housing in the Last Year: No     Number of Places Lived in the Last Year: 1     Unstable Housing in the Last Year: No       Family History   Problem Relation Name Age of Onset    Diabetes Maternal Grandfather Mariano     Breast cancer Maternal Grandmother Candy Pedroza     Cancer Maternal Grandmother Candy Pedroza     Breast cancer Maternal Aunt Shelby     Cancer Maternal Aunt Shelby        Outpatient Encounter Medications as of 9/5/2024   Medication Sig Dispense Refill    clonazePAM (KLONOPIN) 0.5 MG tablet Take 0.5 mg by mouth daily as needed.      colestipoL (COLESTID) 1 gram Tab Take 1 g by mouth 2 (two) times daily.      cyclobenzaprine (FLEXERIL) 10 MG tablet Take 10 mg by mouth nightly as needed.      diclofenac (VOLTAREN) 75 MG EC tablet Take 75 mg by mouth 2 (two) times daily.       "diltiaZEM (CARDIZEM) 30 MG tablet TAKE 1 TABLET BY MOUTH AT BEDTIME FOR 1 WEEK THEN IF NO DIZZINESS INCREASE TO 1 TABLET TWICE DAILY      docusate sodium (COLACE) 100 MG capsule Take 1 capsule (100 mg total) by mouth 2 (two) times daily. 60 capsule 11    famotidine (PEPCID) 40 MG tablet Take 40 mg by mouth every morning.      fluticasone propionate (FLONASE) 50 mcg/actuation nasal spray 1 spray by Nasal route.      NIFEdipine (ADALAT CC) 30 MG TbSR Take 60 mg by mouth nightly.      ondansetron (ZOFRAN-ODT) 8 MG TbDL       progesterone (PROMETRIUM) 200 MG capsule Place 1 capsule (200 mg total) vaginally nightly. From cycle day 17 (or "peak plus 3") until menses. Continue if you become pregnant. 45 capsule 3    tranexamic acid (LYSTEDA) 650 mg tablet Take 2 tablets (1,300 mg total) by mouth 3 (three) times daily. During days of heavy menstrual bleeding. 30 tablet 11    VOQUEZNA 20 mg Tab Take by mouth.      VRAYLAR 1.5 mg Cap Take 1.5 mg by mouth every evening.      azithromycin (Z-SIMONE) 250 MG tablet Take 2 tablets (500mg) once today (on Day1), followed by 1 tablet (250mg) once daily on Day 2 through 5. (Patient not taking: Reported on 9/5/2024)      benzonatate (TESSALON) 100 MG capsule Take 100 mg by mouth 3 (three) times daily as needed. (Patient not taking: Reported on 9/5/2024)      busPIRone (BUSPAR) 10 MG tablet Take 1 tablet (10 mg total) by mouth 2 (two) times daily. (Patient not taking: Reported on 9/5/2024) 60 tablet 2    cholecalciferol, vitamin D3, (VITAMIN D3) 25 mcg (1,000 unit) capsule Take 1 capsule (1,000 Units total) by mouth once daily. (Patient not taking: Reported on 9/5/2024) 90 capsule 3    levocetirizine (XYZAL) 5 MG tablet Take 5 mg by mouth. (Patient not taking: Reported on 9/5/2024)      LIDOCAINE VISCOUS 2 % solution TAKE 1-2 TEASPOONS BY MOUTH 4 TIMES A DAY BEFORE MEALS AND AT BEDTIME FOR EASE OF PAIN (Patient not taking: Reported on 9/5/2024)      polyethylene glycol (GLYCOLAX) 17 gram " PwPk Take 17 g by mouth 2 (two) times daily as needed for Constipation. (Patient not taking: Reported on 2024) 30 packet 2    predniSONE (DELTASONE) 20 MG tablet SMARTSI Tablet(s) By Mouth Morning-Night (Patient not taking: Reported on 2024)      scopolamine (TRANSDERM-SCOP) 1.3-1.5 mg (1 mg over 3 days) 1 patch Every 3 (three) days. (Patient not taking: Reported on 2024)      sertraline (ZOLOFT) 50 MG tablet Take 50 mg by mouth every evening. (Patient not taking: Reported on 2024)      VITAFOL GUMMIES 3.33 mg iron- 0.33 mg Chew Take 3 tablets by mouth nightly. (Patient not taking: Reported on 2024)       No facility-administered encounter medications on file as of 2024.       Physical Exam:  Vitals:    24 0820   BP: 120/85   BP Location: Right arm   Patient Position: Sitting   BP Method: Large (Automatic)   Pulse: 82   Temp: 98.5 °F (36.9 °C)   TempSrc: Oral       Physical Exam   General: NAD, voice normal  Neuro: AAO, CN II - XII grossly intact  Head/ Face: NCAT, symmetric, sensations intact bilaterally  Eyes: EOMI, PERRL  Ears: externally normal with grossly normal hearing  AD: EAC patent, TM intact with some inferior myringosclerosis, no middle ear effusion, no retractions. Able to auto insufflate  AS: EAC patent, TM intact with some inferior myringosclerosis, no middle ear effusion, no retractions. Able to auto insufflate  Nose: bilateral nares patent, midline septum, no rhinorrhea, no external deformity, no turbinate hypertrophy  OC/OP: MMM, no intraoral lesions, no trismus, dentition is moderate, no uvular deviation, bilaterally symmetric soft palate elevation, palatoglossus and palatopharyngeal fold wnl; tonsils are symmetric and 1+  Indirect laryngoscopy: deferred due to patient intolerance  Neck: soft, supple, no LAD, normal ROM, no thyromegaly  Respiratory: nonlabored, no wheezing, bilateral chest rise  Cardiovascular: RRR  Gastrointestinal: S NT ND  Skin: warm, no  lesions  Musculoskeletal: 5/5 strength  Psych: Appropriate affect/mood     Pertinent Data:  ? LABS:  ? AUDIO:           ? PATH:      Imaging:   I personally reviewed the following images:        Assessment/Plan:  29 y.o. female with chronic cough, AR, GERD.  - Flonase BID (reviewed correct technique)   - Xyzal daily  - Tessalon perles TID prn  - [place] hydration  - Continue GI recs  - GERD lifestyle modifications  - Reflux gourmet prn  - RTC 6-8 wks on Res day. Will plan for FFL if no significant improvement    Ally Brown NP

## 2024-09-05 ENCOUNTER — OFFICE VISIT (OUTPATIENT)
Dept: OTOLARYNGOLOGY | Facility: CLINIC | Age: 29
End: 2024-09-05
Payer: MEDICAID

## 2024-09-05 VITALS — SYSTOLIC BLOOD PRESSURE: 120 MMHG | TEMPERATURE: 99 F | HEART RATE: 82 BPM | DIASTOLIC BLOOD PRESSURE: 85 MMHG

## 2024-09-05 DIAGNOSIS — J30.89 NON-SEASONAL ALLERGIC RHINITIS, UNSPECIFIED TRIGGER: ICD-10-CM

## 2024-09-05 DIAGNOSIS — R05.3 CHRONIC COUGH: Primary | ICD-10-CM

## 2024-09-05 DIAGNOSIS — R09.81 NASAL CONGESTION: ICD-10-CM

## 2024-09-05 DIAGNOSIS — K21.9 GASTROESOPHAGEAL REFLUX DISEASE, UNSPECIFIED WHETHER ESOPHAGITIS PRESENT: ICD-10-CM

## 2024-09-05 PROCEDURE — 3074F SYST BP LT 130 MM HG: CPT | Mod: CPTII,,, | Performed by: NURSE PRACTITIONER

## 2024-09-05 PROCEDURE — 99203 OFFICE O/P NEW LOW 30 MIN: CPT | Mod: S$PBB,,, | Performed by: NURSE PRACTITIONER

## 2024-09-05 PROCEDURE — 99214 OFFICE O/P EST MOD 30 MIN: CPT | Mod: PBBFAC | Performed by: NURSE PRACTITIONER

## 2024-09-05 PROCEDURE — 1159F MED LIST DOCD IN RCRD: CPT | Mod: CPTII,,, | Performed by: NURSE PRACTITIONER

## 2024-09-05 PROCEDURE — 3079F DIAST BP 80-89 MM HG: CPT | Mod: CPTII,,, | Performed by: NURSE PRACTITIONER

## 2024-09-05 RX ORDER — CLONAZEPAM 0.5 MG/1
0.5 TABLET ORAL DAILY PRN
COMMUNITY
Start: 2024-04-16

## 2024-09-05 RX ORDER — BENZONATATE 100 MG/1
100 CAPSULE ORAL 3 TIMES DAILY PRN
COMMUNITY
Start: 2024-08-31 | End: 2024-09-07

## 2024-09-05 RX ORDER — MONTELUKAST SODIUM 4 MG/1
1 TABLET, CHEWABLE ORAL 2 TIMES DAILY
COMMUNITY
Start: 2024-09-01

## 2024-09-05 RX ORDER — SCOLOPAMINE TRANSDERMAL SYSTEM 1 MG/1
1 PATCH, EXTENDED RELEASE TRANSDERMAL
COMMUNITY
Start: 2024-04-08

## 2024-09-05 RX ORDER — DICLOFENAC SODIUM 75 MG/1
75 TABLET, DELAYED RELEASE ORAL 2 TIMES DAILY
COMMUNITY

## 2024-09-05 RX ORDER — DILTIAZEM HYDROCHLORIDE 30 MG/1
TABLET, FILM COATED ORAL
COMMUNITY
Start: 2024-08-05

## 2024-09-05 RX ORDER — LEVOCETIRIZINE DIHYDROCHLORIDE 5 MG/1
5 TABLET, FILM COATED ORAL
COMMUNITY
Start: 2024-08-31 | End: 2024-09-14

## 2024-09-05 RX ORDER — CARIPRAZINE 1.5 MG/1
1.5 CAPSULE, GELATIN COATED ORAL NIGHTLY
COMMUNITY
Start: 2024-04-16

## 2024-09-05 RX ORDER — SERTRALINE HYDROCHLORIDE 50 MG/1
50 TABLET, FILM COATED ORAL NIGHTLY
COMMUNITY
Start: 2024-04-16

## 2024-09-05 RX ORDER — LIDOCAINE HYDROCHLORIDE 20 MG/ML
SOLUTION ORAL; TOPICAL
COMMUNITY
Start: 2024-07-02

## 2024-09-05 RX ORDER — AZITHROMYCIN 250 MG/1
TABLET, FILM COATED ORAL
COMMUNITY
Start: 2024-08-31

## 2024-09-05 RX ORDER — FLUTICASONE PROPIONATE 50 MCG
2 SPRAY, SUSPENSION (ML) NASAL 2 TIMES DAILY
Qty: 48 G | Refills: 3 | Status: SHIPPED | OUTPATIENT
Start: 2024-09-05 | End: 2024-09-15

## 2024-09-05 RX ORDER — VONOPRAZAN FUMARATE 26.72 MG/1
TABLET ORAL
COMMUNITY

## 2024-09-05 RX ORDER — ONDANSETRON 8 MG/1
TABLET, ORALLY DISINTEGRATING ORAL
COMMUNITY
Start: 2024-07-15

## 2024-09-05 RX ORDER — PREDNISONE 20 MG/1
TABLET ORAL
COMMUNITY
Start: 2024-07-05

## 2024-09-05 RX ORDER — CYCLOBENZAPRINE HCL 10 MG
10 TABLET ORAL NIGHTLY PRN
COMMUNITY
Start: 2024-05-22

## 2024-09-05 RX ORDER — FLUTICASONE PROPIONATE 50 MCG
1 SPRAY, SUSPENSION (ML) NASAL
COMMUNITY
Start: 2024-08-31 | End: 2024-09-05 | Stop reason: SDUPTHER

## 2024-10-29 ENCOUNTER — OFFICE VISIT (OUTPATIENT)
Dept: OTOLARYNGOLOGY | Facility: CLINIC | Age: 29
End: 2024-10-29
Payer: MEDICAID

## 2024-10-29 VITALS — TEMPERATURE: 98 F | HEART RATE: 76 BPM | DIASTOLIC BLOOD PRESSURE: 83 MMHG | SYSTOLIC BLOOD PRESSURE: 119 MMHG

## 2024-10-29 DIAGNOSIS — J32.8 OTHER CHRONIC SINUSITIS: Primary | ICD-10-CM

## 2024-10-29 DIAGNOSIS — R05.3 CHRONIC COUGH: ICD-10-CM

## 2024-10-29 PROCEDURE — 31575 DIAGNOSTIC LARYNGOSCOPY: CPT | Mod: PBBFAC | Performed by: NURSE PRACTITIONER

## 2024-10-29 PROCEDURE — 31575 DIAGNOSTIC LARYNGOSCOPY: CPT | Mod: S$PBB,,, | Performed by: NURSE PRACTITIONER

## 2024-10-29 PROCEDURE — 3074F SYST BP LT 130 MM HG: CPT | Mod: CPTII,,, | Performed by: NURSE PRACTITIONER

## 2024-10-29 PROCEDURE — 3079F DIAST BP 80-89 MM HG: CPT | Mod: CPTII,,, | Performed by: NURSE PRACTITIONER

## 2024-10-29 PROCEDURE — 1159F MED LIST DOCD IN RCRD: CPT | Mod: CPTII,,, | Performed by: NURSE PRACTITIONER

## 2024-10-29 PROCEDURE — 99214 OFFICE O/P EST MOD 30 MIN: CPT | Mod: 25,S$PBB,, | Performed by: NURSE PRACTITIONER

## 2024-10-29 PROCEDURE — 99215 OFFICE O/P EST HI 40 MIN: CPT | Mod: PBBFAC | Performed by: NURSE PRACTITIONER

## 2024-10-29 RX ORDER — LIDOCAINE HYDROCHLORIDE 40 MG/ML
1 INJECTION, SOLUTION RETROBULBAR ONCE
Status: SHIPPED | OUTPATIENT
Start: 2024-10-29

## 2024-10-29 RX ORDER — DULOXETIN HYDROCHLORIDE 30 MG/1
30 CAPSULE, DELAYED RELEASE ORAL EVERY MORNING
COMMUNITY

## 2024-10-29 RX ORDER — IPRATROPIUM BROMIDE 21 UG/1
2 SPRAY, METERED NASAL 3 TIMES DAILY PRN
Qty: 30 ML | Refills: 5 | Status: SHIPPED | OUTPATIENT
Start: 2024-10-29 | End: 2025-10-29

## 2024-10-29 RX ORDER — BENZONATATE 100 MG/1
100 CAPSULE ORAL 3 TIMES DAILY PRN
COMMUNITY

## 2024-11-06 ENCOUNTER — PATIENT MESSAGE (OUTPATIENT)
Dept: OTOLARYNGOLOGY | Facility: CLINIC | Age: 29
End: 2024-11-06
Payer: MEDICAID

## 2024-12-09 ENCOUNTER — HOSPITAL ENCOUNTER (OUTPATIENT)
Dept: RADIOLOGY | Facility: HOSPITAL | Age: 29
Discharge: HOME | End: 2024-12-09
Attending: NURSE PRACTITIONER
Payer: MEDICAID

## 2024-12-09 DIAGNOSIS — J32.8 OTHER CHRONIC SINUSITIS: ICD-10-CM

## 2024-12-09 DIAGNOSIS — R05.3 CHRONIC COUGH: ICD-10-CM

## 2024-12-09 PROCEDURE — 70486 CT MAXILLOFACIAL W/O DYE: CPT | Mod: TC

## 2024-12-09 PROCEDURE — 71046 X-RAY EXAM CHEST 2 VIEWS: CPT | Mod: TC

## 2024-12-16 ENCOUNTER — OFFICE VISIT (OUTPATIENT)
Dept: OTOLARYNGOLOGY | Facility: CLINIC | Age: 29
End: 2024-12-16
Payer: MEDICAID

## 2024-12-16 DIAGNOSIS — J30.89 NON-SEASONAL ALLERGIC RHINITIS, UNSPECIFIED TRIGGER: ICD-10-CM

## 2024-12-16 DIAGNOSIS — R05.3 CHRONIC COUGH: Primary | ICD-10-CM

## 2024-12-16 PROCEDURE — 99213 OFFICE O/P EST LOW 20 MIN: CPT | Mod: 95,,, | Performed by: NURSE PRACTITIONER

## 2024-12-16 PROCEDURE — 1159F MED LIST DOCD IN RCRD: CPT | Mod: CPTII,95,, | Performed by: NURSE PRACTITIONER

## 2024-12-16 RX ORDER — VONOPRAZAN FUMARATE 13.36 MG/1
TABLET ORAL
COMMUNITY
Start: 2024-12-05

## 2024-12-16 NOTE — PROGRESS NOTES
Audio Only Telehealth Visit     The patient location is: [facility]  The chief complaint leading to consultation is: f/u  Visit type: Virtual visit with audio only (telephone)  Total time spent on visit: 15 min     The reason for the audio only service rather than synchronous audio and video virtual visit was related to technical difficulties or patient preference/necessity.     Each patient to whom I provide medical services by telemedicine is:  (1) informed of the relationship between the physician and patient and the respective role of any other health care provider with respect to management of the patient; and (2) notified that they may decline to receive medical services by telemedicine and may withdraw from such care at any time. Patient verbally consented to receive this service via voice-only telephone call.       HPI: 09/05/2024: 29 y.o. female referred by PCP for chronic cough. She reports this began 11 months ago when she was pregnant. Denies exacerbating or relieving factors. Endorses nasal congestion, infraorbital pressure, & ear aches. Denies rhinitis, PND, hyposmia, sneezing, or itchy/watery eyes. Reports occasional hearing loss when her allergy or cold symptoms flare. Denies hx of PE tubes or otologic surgery. She is unsure if she had recurrent ear infections. Endorses globus & severe reflux. Never smoker. Reports she has dysphagia r/t esophageal spasms. Occasional dysphonia which spontaneously resolves. States she just had a pH study done and was taken off PPI & put on voquenza. Has used flonase prn when congestion & facial pressure was bad. Recently prescribed xyzal + tessalon perles but has not picked them up from the pharmacy yet. Denies SOB apart from exertional which is stable. She feels this is r/t being overweight but she has struggled to lose weight despite dietary modifications.     10/29/2024: Denies any improvement in cough with flonase, zyrtec, & tessalon perles.     12/16/2024: F/u  after imaging. Has appt with PCP next month.     Imaging:           Assessment and plan:  29 y.o. female with chronic cough, AR, GERD. Unimproved with tx. FFL unremarkable. CT sinus without dz- reviewed. CXR WNL- reviewed.   - Atrovent TID prn  - [place] hydration  - Continue GI recs  - Pulm referral  - PCP may consider tx for neurogenic cough  - RTC 6-8 wks, office or telemed    Ally Brown NP      This service was not originating from a related E/M service provided within the previous 7 days nor will  to an E/M service or procedure within the next 24 hours or my soonest available appointment.  Prevailing standard of care was able to be met in this audio-only visit.

## 2025-03-14 PROCEDURE — 87624 HPV HI-RISK TYP POOLED RSLT: CPT

## 2025-05-19 ENCOUNTER — RESULTS FOLLOW-UP (OUTPATIENT)
Dept: OBSTETRICS AND GYNECOLOGY | Facility: HOSPITAL | Age: 30
End: 2025-05-19

## 2025-05-27 PROBLEM — N92.0 MENORRHAGIA WITH REGULAR CYCLE: Status: RESOLVED | Noted: 2024-03-21 | Resolved: 2025-05-27

## 2025-05-27 PROBLEM — N94.10 DYSPAREUNIA IN FEMALE: Status: RESOLVED | Noted: 2023-08-30 | Resolved: 2025-05-27

## 2025-05-27 PROBLEM — N94.6 DYSMENORRHEA: Status: RESOLVED | Noted: 2024-03-21 | Resolved: 2025-05-27

## 2025-05-27 PROBLEM — Z87.59 HISTORY OF GESTATIONAL HYPERTENSION: Status: ACTIVE | Noted: 2025-05-27

## 2025-05-27 PROBLEM — E28.2 PCOS (POLYCYSTIC OVARIAN SYNDROME): Status: RESOLVED | Noted: 2024-03-21 | Resolved: 2025-05-27

## 2025-05-27 PROBLEM — O30.049 DICHORIONIC DIAMNIOTIC TWIN PREGNANCY, ANTEPARTUM: Status: ACTIVE | Noted: 2025-05-27

## 2025-06-06 ENCOUNTER — RESULTS FOLLOW-UP (OUTPATIENT)
Dept: OBSTETRICS AND GYNECOLOGY | Facility: HOSPITAL | Age: 30
End: 2025-06-06

## 2025-06-11 DIAGNOSIS — O30.049 DICHORIONIC DIAMNIOTIC TWIN PREGNANCY, ANTEPARTUM: Primary | ICD-10-CM

## 2025-06-11 DIAGNOSIS — Z87.59 HISTORY OF GESTATIONAL HYPERTENSION: ICD-10-CM

## 2025-06-11 DIAGNOSIS — F31.9 BIPOLAR AFFECTIVE DISORDER, REMISSION STATUS UNSPECIFIED: ICD-10-CM

## 2025-06-17 ENCOUNTER — PROCEDURE VISIT (OUTPATIENT)
Dept: MATERNAL FETAL MEDICINE | Facility: CLINIC | Age: 30
End: 2025-06-17
Payer: MEDICAID

## 2025-06-17 ENCOUNTER — OFFICE VISIT (OUTPATIENT)
Dept: MATERNAL FETAL MEDICINE | Facility: CLINIC | Age: 30
End: 2025-06-17
Payer: MEDICAID

## 2025-06-17 VITALS
HEIGHT: 67 IN | WEIGHT: 254.06 LBS | BODY MASS INDEX: 39.88 KG/M2 | DIASTOLIC BLOOD PRESSURE: 78 MMHG | SYSTOLIC BLOOD PRESSURE: 114 MMHG | HEART RATE: 89 BPM

## 2025-06-17 DIAGNOSIS — O30.049 DICHORIONIC DIAMNIOTIC TWIN PREGNANCY, ANTEPARTUM: ICD-10-CM

## 2025-06-17 DIAGNOSIS — O21.0 HYPEREMESIS AFFECTING PREGNANCY, ANTEPARTUM: ICD-10-CM

## 2025-06-17 DIAGNOSIS — Z87.59 HISTORY OF GESTATIONAL HYPERTENSION: ICD-10-CM

## 2025-06-17 DIAGNOSIS — O99.341 MENTAL DISORDER AFFECTING PREGNANCY IN FIRST TRIMESTER: ICD-10-CM

## 2025-06-17 DIAGNOSIS — G47.39 OTHER SLEEP APNEA: ICD-10-CM

## 2025-06-17 DIAGNOSIS — F31.9 BIPOLAR AFFECTIVE DISORDER, REMISSION STATUS UNSPECIFIED: ICD-10-CM

## 2025-06-17 DIAGNOSIS — O99.211 SEVERE OBESITY DUE TO EXCESS CALORIES AFFECTING PREGNANCY IN FIRST TRIMESTER: ICD-10-CM

## 2025-06-17 DIAGNOSIS — O30.049 DICHORIONIC DIAMNIOTIC TWIN PREGNANCY, ANTEPARTUM: Primary | ICD-10-CM

## 2025-06-17 DIAGNOSIS — E66.01 SEVERE OBESITY DUE TO EXCESS CALORIES AFFECTING PREGNANCY IN FIRST TRIMESTER: ICD-10-CM

## 2025-06-17 PROCEDURE — 1160F RVW MEDS BY RX/DR IN RCRD: CPT | Mod: CPTII,S$GLB,, | Performed by: OBSTETRICS & GYNECOLOGY

## 2025-06-17 PROCEDURE — 99204 OFFICE O/P NEW MOD 45 MIN: CPT | Mod: TH,S$GLB,, | Performed by: OBSTETRICS & GYNECOLOGY

## 2025-06-17 PROCEDURE — 3074F SYST BP LT 130 MM HG: CPT | Mod: CPTII,S$GLB,, | Performed by: OBSTETRICS & GYNECOLOGY

## 2025-06-17 PROCEDURE — 76802 OB US < 14 WKS ADDL FETUS: CPT | Mod: S$GLB,,, | Performed by: OBSTETRICS & GYNECOLOGY

## 2025-06-17 PROCEDURE — 76801 OB US < 14 WKS SINGLE FETUS: CPT | Mod: S$GLB,,, | Performed by: OBSTETRICS & GYNECOLOGY

## 2025-06-17 PROCEDURE — 76817 TRANSVAGINAL US OBSTETRIC: CPT | Mod: S$GLB,,, | Performed by: OBSTETRICS & GYNECOLOGY

## 2025-06-17 PROCEDURE — 3008F BODY MASS INDEX DOCD: CPT | Mod: CPTII,S$GLB,, | Performed by: OBSTETRICS & GYNECOLOGY

## 2025-06-17 PROCEDURE — 1159F MED LIST DOCD IN RCRD: CPT | Mod: CPTII,S$GLB,, | Performed by: OBSTETRICS & GYNECOLOGY

## 2025-06-17 PROCEDURE — 3078F DIAST BP <80 MM HG: CPT | Mod: CPTII,S$GLB,, | Performed by: OBSTETRICS & GYNECOLOGY

## 2025-06-17 NOTE — PROGRESS NOTES
MATERNAL-FETAL MEDICINE   CONSULT NOTE    Provider requesting consultation: Dr Geiger    SUBJECTIVE:     Ms. Tressa Sheppard is a 30 y.o.  female with IUP at 10w1d who is seen in consultation by MFM for evaluation and management of:  Problem   - Bipolar, anxiety, depression affecting pregnancy in first trimester   -BMI affecting pregnancy in first trimester   -Sleep apnea   - Dichorionic diamniotic twin pregnancy, antepartum   - History of gestational hypertension   - Hyperemesis affecting pregnancy, antepartum     Tressa is being referred for a dichorionic twin gestation.  She has been prescribed low-dose aspirin daily and instructed to begin at 12 weeks gestation.  BMI 40   History of bipolar, anxiety, depression.  She was taking Cymbalta prior to pregnancy and discontinued at positive UPT.  She was instructed to discontinue this medication by her primary care provider.  She has discussed this with her primary OB who has advised her to restart Cymbalta as she feels as though her symptoms are returning.  She plans on restarting Cymbalta this week.  History of hyperemesis in prior pregnancy with significant nausea and vomiting in the early 1st trimester with her current pregnancy.  She is currently taking Unisom/B6, Reglan, famotidine, scopolamine, Benadryl, and Zofran scheduled.  She has a prescription of Phenergan suppositories p.r.n., however, she states she has not used this medication.  She is not vomiting anymore, but states she still has significant nausea.  She admits to not hydrating adequately but is working on it.  She has complaints of significant constipation since being on this medication regimen.    History of gestational hypertension that developed at term.  Reports normalization of blood pressures following delivery.  History of sleep apnea and previously was prescribed CPAP.  She no longer has this machine but does endorse snoring and frequent nocturnal awakenings.  Interested in being  referred to sleep Medicine.  Short birth interval vaginal delivery in January 2024.       Medication List with Changes/Refills   Current Medications    ASPIRIN (ECOTRIN) 81 MG EC TABLET    Take 2 tablets (162 mg total) by mouth once daily. Start at 12 weeks gestation.  At 36 weeks gestation, decrease to one 81mg tablet daily.    DULOXETINE (CYMBALTA) 30 MG CAPSULE    Take 30 mg by mouth every morning.    FAMOTIDINE (PEPCID) 40 MG TABLET    Take 40 mg by mouth every morning.    /IRON/FOLIC/OM3/DHA/EPA (VITAFOL GUMMIES ORAL)    Take by mouth.    PRENATAL VIT NO.130-IRON-FOLIC (PRENATAL VITAMIN) 27 MG IRON- 800 MCG TAB    Take 1 tablet by mouth Daily.    SCOPOLAMINE (TRANSDERM-SCOP) 1.3-1.5 MG (1 MG OVER 3 DAYS)    Place 1 patch onto the skin every 72 hours.   Discontinued Medications    CLONAZEPAM (KLONOPIN) 0.5 MG TABLET    Take 0.5 mg by mouth daily as needed.    CYCLOBENZAPRINE (FLEXERIL) 10 MG TABLET    Take 10 mg by mouth nightly as needed.    DILTIAZEM (CARDIZEM) 30 MG TABLET    TAKE 1 TABLET BY MOUTH AT BEDTIME FOR 1 WEEK THEN IF NO DIZZINESS INCREASE TO 1 TABLET TWICE DAILY    IPRATROPIUM (ATROVENT) 21 MCG (0.03 %) NASAL SPRAY    2 sprays by Each Nostril route 3 (three) times daily as needed for Rhinitis (PND/throat tickle).    LIDOCAINE VISCOUS 2 % SOLUTION    TAKE 1-2 TEASPOONS BY MOUTH 4 TIMES A DAY BEFORE MEALS AND AT BEDTIME FOR EASE OF PAIN    METOCLOPRAMIDE HCL (REGLAN) 10 MG TABLET    Take 1 tablet (10 mg total) by mouth 3 (three) times daily with meals.    ONDANSETRON (ZOFRAN-ODT) 4 MG TBDL    Take 1 tablet (4 mg total) by mouth every 6 (six) hours as needed.    PROMETHAZINE (PHENERGAN) 25 MG SUPPOSITORY    Place 1 suppository (25 mg total) rectally every 6 (six) hours as needed for Nausea.    VITAFOL GUMMIES 3.33 MG IRON- 0.33 MG CHEW    Take 3 tablets by mouth nightly.       Review of patient's allergies indicates:   Allergen Reactions    Amoxicillin Rash     As a baby       PMH:  Past  Medical History:   Diagnosis Date    Acne 2016    Anxiety and depression     Bipolar disorder, unspecified     Dyspareunia     GERD (gastroesophageal reflux disease) 2019    Gestational hypertension affecting fifth pregnancy     Hyperemesis gravidarum     Obesity 2010    Seasonal allergies     Sleep apnea 2020    Ulcer        PObHx:  OB History    Para Term  AB Living   2 1 1   1   SAB IAB Ectopic Multiple Live Births      0 1      # Outcome Date GA Lbr Tee/2nd Weight Sex Type Anes PTL Lv   2 Current            1 Term 24 37w5d 14:40 / 00:44 3.232 kg (7 lb 2 oz) M Vag-Spont EPI N RAY       PSH:  Past Surgical History:   Procedure Laterality Date    CHOLECYSTECTOMY  2020    EGD, WITH CLOSED BIOPSY  2024    Procedure: EGD, WITH CLOSED BIOPSY;  Surgeon: Florin Ferraro MD;  Location: [facility] ENDOSCOPY;  Service: Gastroenterology;;    ESOPHAGEAL MOTILITY STUDY USING HIGH RESOLUTION MANOMETRY N/A 2023    Procedure: 730am MOTILITY w/ RAPID SWALLOW;  Surgeon: Florin Ferraro MD;  Location: [facility] ENDOSCOPY;  Service: Gastroenterology;  Laterality: N/A;  Scheduled for 0730    ESOPHAGOGASTRODUODENOSCOPY N/A 2024    Procedure: BRAVO 96 HR;  Surgeon: Florin Ferraro MD;  Location: [facility] ENDOSCOPY;  Service: Gastroenterology;  Laterality: N/A;  BRAVO placed @ 31cm @ 1253am    INSERTION OF PH PROBE N/A 2024    Procedure: BRAVO;  Surgeon: Florin Ferraro MD;  Location: [facility] ENDOSCOPY;  Service: Gastroenterology;  Laterality: N/A;    TONSILLECTOMY AND ADENOIDECTOMY         Family history:family history includes Breast cancer in her maternal aunt and maternal grandmother; Cancer in her maternal aunt and maternal grandmother; Diabetes in her maternal grandfather; Drug abuse in her father.    Social history: reports that she has never smoked. She has never used smokeless tobacco. She reports that she does not currently use alcohol. She reports that she does not use  "drugs.    Genetic history: The patient denies any inherited genetic diseases or birth defects in herself or her partner's personal history or family.    Objective:   /78 (BP Location: Right arm, Patient Position: Sitting)   Pulse 89   Ht 5' 7" (1.702 m)   Wt 115.2 kg (254 lb 1.3 oz)   LMP 2025 (Approximate)   Breastfeeding Unknown   BMI 39.79 kg/m²     Ultrasound performed. See viewpoint for full ultrasound report.    A viable dichorionic- diamniotic pregnancy is visualized with thick dividing membrane/ lambda sign. Both fetuses appear normal without abnormalities. Estimated fetal weights for the fetuses are appropriate for gestational age and congruent. Amniotic fluid volume is normal for both.  Placental locations are anterior without evidence of previa.     ASSESSMENT/PLAN:     30 y.o.  female with IUP at 10w1d     Assessment & Plan  - Dichorionic diamniotic twin pregnancy, antepartum  Today I discussed with the patient the finding of a dichorionic-diamniotic twin pregnancy and the risks associated with this type of pregnancy. I counseled her on the increased incidence of preeclampsia/gestational hypertension, gestational diabetes, fetal growth restriction, anemia, congenital anomalies, need for antepartum hospitalization,  labor/PPROM, stillbirth, and risk of postpartum hemorrhage that can occur in twin pregnancies. We discussed plans for monthly ultrasound surveillance looking for signs of fetal growth restriction.     Recommendations:   Consider transvaginal cervical length screening at time of targeted anatomy ultrasound w/ MFM (scheduled with MFM)  Detailed anatomy surveys at 19-20 weeks (scheduled with MFM)  Fetal growth ultrasounds every 3-4 weeks starting at 23-24 weeks (scheduled with MFM)  Low-dose aspirin daily (81 mg) beginning at 12-16 weeks to reduce the risk of preeclampsia  Folic acid 1 mg daily and ferrous sulfate 325 mg daily  Increase daily dietary intake by " approximately 300 kcal above that for a meadows pregnancy, or 600 kcal over that of a nonpregnant woman and monitor weight gain   testing with weekly NST and MVP assessment beginning at 32 weeks (this is to be ordered by primary OB provider)  Given the increased risks of a twin pregnancy, prenatal visits every 2-3 weeks from 24 - 32/34 weeks and weekly prenatal visits thereafter    Delivery timing:   Normal growth and testin 0/7 - 38 6/7 weeks gestation   Normal growth, comorbid condition: 37 0/7 - 37 6/7 weeks gestation   IUGR of one or both: 36 0/7 - 36 6/7 weeks gestation   IUGR with abnormal UA Doppler, oligohydramnios, or multiple comorbid conditions:    32 0/7 - 34 6/7 weeks gestation    Comorbid conditions include: BMI >= 40, diabetes, hypertension, and complex maternal medical conditions associated with placental dysfunction (Lupus, renal disease, or other vascular disease).    - History of gestational hypertension  I reviewed the patient's obstetric history which is remarkable for development of gHTN at term. We reviewed the risk of recurrence in subsequent pregnancies. Subsequent pregnancies in women with severe preeclampsia are at risk of other  complications including abruption,  birth, FGR, and increased  mortality. The patient's blood pressure normalized following delivery. We reviewed the role of low dose aspirin therapy in regards to preeclampsia risk reduction in subsequent pregnancies.    Recommendations:  Start low dose aspirin at 12-16 weeks for preeclampsia risk reduction  Consider baseline preeclampsia studies: (CMP, CBC, urine protein/creatinine ratio)  Close surveillance for signs/symptoms of preeclampsia in second/third trimester and postpartum period    - Hyperemesis affecting pregnancy, antepartum  We have discussed the complex etiology of hyperemesis in pregnancy. She reports having similar (although slightly worse) symptoms in her previous  pregnancy.   Her current regimen is as follows:  -Famotidine daily  -Unisom/B6 daily   -Reglan TID  -Zofran Q6h  -Scopolamine patches  -Phenergan supp PRN (although does not use)    She admits to not hydrating well even prior to pregnancy. She reports not hydrating well currently due to constant nausea. Encouraged PO hydration. If unable to hydrate adequately, may consider Home Health for IV fluids.   She also has complaints of severe constipation given her medication regimen and inability to hydrate. Recommend Colace BID and Miralax daily.        - Bipolar, anxiety, depression affecting pregnancy in first trimester  She reports a history of anxiety, depression, and bipolar. She is not taking medication daily. She was taking Cymbalta prior to pregnancy but discontinued at +UPT. She is interested in restarting her medication because she feels the symptoms are returning. Dr Geiger has advised her to restart and she plans to do so. We discussed the usage of Cymbalta in pregnancy and the minimal risks associated with the fetus. Discussed increased risk for peripartum and postpartum mood disorders. Precautions provided.     It is important to optimize mental health conditions during pregnancy in an effort to reduce the risk of postpartum mood disorders. There are options for management including psychotherapy, counseling, cognitive behavioral therapy, exercise/yoga, journaling, and psychiatry services.     Severe obesity due to excess calories affecting pregnancy in first trimester  There are  risks associated with obesity which include and increased risk of hypertension, preeclampsia, gestational diabetes, venous thromboembolic disease,  delivery, macrosomia (with resultant shoulder dystocia, obstetric sphincter injury), IUFD, longer first stage of labor, decreased TOLAC success rate, emergent & scheduled  & complications of  (prolonged OR time, delayed delivery, excessive EBL, infection,  wound separation/infection, higher spinal failure rate, more difficult intubation).  Obesity is also associated with fetal anomalies, specifically neural tube defects.  Studies have shown that the rate of complication increases with rising BMI and thus pregnancies with maternal morbid obesity are at increased risk.      BMI 40    Recommendations:  TWG goal is 11-20 lbs  Screen for signs/symptoms of obstructive sleep apnea  Nutritionist consult offered (this is to be ordered by primary OB provider)  Consider early 1 hr GCT (between 14-16 weeks gestation); repeat at 24-28 weeks gestation  Lovenox 40 mg BID for VTE prophylaxis while admitted to the hospital (antepartum or postpartum) if BMI >= 40.   Encouraged breastfeeding  Postpartum lifestyle modifications & weight loss    -Sleep apnea  She has a history of ANTONIETTA. She previously was prescribed CPAP but no longer has the machine.   There are increased risks associated with sleep apnea in pregnancy. We recommend she obtain a CPAP machine and use as prescribed. Will refer to sleep medicine for further guidance/treatment.        FOLLOW UP:   F/u in 4 weeks for US/MFM visit    Yaritza Barreto MD  Maternal Fetal Medicine

## 2025-06-17 NOTE — ASSESSMENT & PLAN NOTE
I reviewed the patient's obstetric history which is remarkable for development of gHTN at term. We reviewed the risk of recurrence in subsequent pregnancies. Subsequent pregnancies in women with severe preeclampsia are at risk of other  complications including abruption,  birth, FGR, and increased  mortality. The patient's blood pressure normalized following delivery. We reviewed the role of low dose aspirin therapy in regards to preeclampsia risk reduction in subsequent pregnancies.    Recommendations:  Start low dose aspirin at 12-16 weeks for preeclampsia risk reduction  Consider baseline preeclampsia studies: (CMP, CBC, urine protein/creatinine ratio)  Close surveillance for signs/symptoms of preeclampsia in second/third trimester and postpartum period

## 2025-06-17 NOTE — ASSESSMENT & PLAN NOTE
Today I discussed with the patient the finding of a dichorionic-diamniotic twin pregnancy and the risks associated with this type of pregnancy. I counseled her on the increased incidence of preeclampsia/gestational hypertension, gestational diabetes, fetal growth restriction, anemia, congenital anomalies, need for antepartum hospitalization,  labor/PPROM, stillbirth, and risk of postpartum hemorrhage that can occur in twin pregnancies. We discussed plans for monthly ultrasound surveillance looking for signs of fetal growth restriction.     Recommendations:   Consider transvaginal cervical length screening at time of targeted anatomy ultrasound w/ MFM (scheduled with MFM)  Detailed anatomy surveys at 19-20 weeks (scheduled with MFM)  Fetal growth ultrasounds every 3-4 weeks starting at 23-24 weeks (scheduled with MFM)  Low-dose aspirin daily (81 mg) beginning at 12-16 weeks to reduce the risk of preeclampsia  Folic acid 1 mg daily and ferrous sulfate 325 mg daily  Increase daily dietary intake by approximately 300 kcal above that for a meadows pregnancy, or 600 kcal over that of a nonpregnant woman and monitor weight gain   testing with weekly NST and MVP assessment beginning at 32 weeks (this is to be ordered by primary OB provider)  Given the increased risks of a twin pregnancy, prenatal visits every 2-3 weeks from 24 - 32/34 weeks and weekly prenatal visits thereafter    Delivery timing:   Normal growth and testin 0/7 - 38 6/7 weeks gestation   Normal growth, comorbid condition: 37 0/7 - 37 6/7 weeks gestation   IUGR of one or both: 36 0/7 - 36 6/7 weeks gestation   IUGR with abnormal UA Doppler, oligohydramnios, or multiple comorbid conditions:    32 0/7 - 34 6/7 weeks gestation    Comorbid conditions include: BMI >= 40, diabetes, hypertension, and complex maternal medical conditions associated with placental dysfunction (Lupus, renal disease, or other vascular disease).

## 2025-06-17 NOTE — ASSESSMENT & PLAN NOTE
She reports a history of anxiety, depression, and bipolar. She is not taking medication daily. She was taking Cymbalta prior to pregnancy but discontinued at +UPT. She is interested in restarting her medication because she feels the symptoms are returning. Dr Geiger has advised her to restart and she plans to do so. We discussed the usage of Cymbalta in pregnancy and the minimal risks associated with the fetus. Discussed increased risk for peripartum and postpartum mood disorders. Precautions provided.     It is important to optimize mental health conditions during pregnancy in an effort to reduce the risk of postpartum mood disorders. There are options for management including psychotherapy, counseling, cognitive behavioral therapy, exercise/yoga, journaling, and psychiatry services.

## 2025-06-17 NOTE — ASSESSMENT & PLAN NOTE
She has a history of ANTONIETTA. She previously was prescribed CPAP but no longer has the machine.   There are increased risks associated with sleep apnea in pregnancy. We recommend she obtain a CPAP machine and use as prescribed. Will refer to sleep medicine for further guidance/treatment.

## 2025-06-17 NOTE — ASSESSMENT & PLAN NOTE
We have discussed the complex etiology of hyperemesis in pregnancy. She reports having similar (although slightly worse) symptoms in her previous pregnancy.   Her current regimen is as follows:  -Famotidine daily  -Unisom/B6 daily   -Reglan TID  -Zofran Q6h  -Scopolamine patches  -Phenergan supp PRN (although does not use)    She admits to not hydrating well even prior to pregnancy. She reports not hydrating well currently due to constant nausea. Encouraged PO hydration. If unable to hydrate adequately, may consider Home Health for IV fluids.   She also has complaints of severe constipation given her medication regimen and inability to hydrate. Recommend Colace BID and Miralax daily.

## 2025-06-17 NOTE — ASSESSMENT & PLAN NOTE
There are  risks associated with obesity which include and increased risk of hypertension, preeclampsia, gestational diabetes, venous thromboembolic disease,  delivery, macrosomia (with resultant shoulder dystocia, obstetric sphincter injury), IUFD, longer first stage of labor, decreased TOLAC success rate, emergent & scheduled  & complications of  (prolonged OR time, delayed delivery, excessive EBL, infection, wound separation/infection, higher spinal failure rate, more difficult intubation).  Obesity is also associated with fetal anomalies, specifically neural tube defects.  Studies have shown that the rate of complication increases with rising BMI and thus pregnancies with maternal morbid obesity are at increased risk.      BMI 40    Recommendations:  TWG goal is 11-20 lbs  Screen for signs/symptoms of obstructive sleep apnea  Nutritionist consult offered (this is to be ordered by primary OB provider)  Consider early 1 hr GCT (between 14-16 weeks gestation); repeat at 24-28 weeks gestation  Lovenox 40 mg BID for VTE prophylaxis while admitted to the hospital (antepartum or postpartum) if BMI >= 40.   Encouraged breastfeeding  Postpartum lifestyle modifications & weight loss

## 2025-06-23 ENCOUNTER — HOSPITAL ENCOUNTER (EMERGENCY)
Facility: HOSPITAL | Age: 30
Discharge: HOME | End: 2025-06-23
Attending: STUDENT IN AN ORGANIZED HEALTH CARE EDUCATION/TRAINING PROGRAM
Payer: MEDICAID

## 2025-06-23 ENCOUNTER — PATIENT MESSAGE (OUTPATIENT)
Dept: MATERNAL FETAL MEDICINE | Facility: CLINIC | Age: 30
End: 2025-06-23
Payer: MEDICAID

## 2025-06-23 VITALS
WEIGHT: 260 LBS | HEIGHT: 67 IN | OXYGEN SATURATION: 100 % | HEART RATE: 105 BPM | SYSTOLIC BLOOD PRESSURE: 116 MMHG | RESPIRATION RATE: 20 BRPM | TEMPERATURE: 98 F | BODY MASS INDEX: 40.81 KG/M2 | DIASTOLIC BLOOD PRESSURE: 73 MMHG

## 2025-06-23 DIAGNOSIS — N39.0 URINARY TRACT INFECTION WITHOUT HEMATURIA, SITE UNSPECIFIED: ICD-10-CM

## 2025-06-23 DIAGNOSIS — R11.2 NAUSEA AND VOMITING, UNSPECIFIED VOMITING TYPE: Primary | ICD-10-CM

## 2025-06-23 DIAGNOSIS — R10.9 ABDOMINAL PAIN DURING PREGNANCY: ICD-10-CM

## 2025-06-23 DIAGNOSIS — O26.899 ABDOMINAL PAIN DURING PREGNANCY: ICD-10-CM

## 2025-06-23 LAB
ALBUMIN SERPL-MCNC: 3.3 G/DL (ref 3.5–5)
ALBUMIN/GLOB SERPL: 0.8 RATIO (ref 1.1–2)
ALP SERPL-CCNC: 66 UNIT/L (ref 40–150)
ALT SERPL-CCNC: 21 UNIT/L (ref 0–55)
ANION GAP SERPL CALC-SCNC: 8 MEQ/L
AST SERPL-CCNC: 18 UNIT/L (ref 11–45)
B-HCG UR QL: POSITIVE
BACTERIA #/AREA URNS AUTO: ABNORMAL /HPF
BASOPHILS # BLD AUTO: 0.02 X10(3)/MCL
BASOPHILS NFR BLD AUTO: 0.2 %
BILIRUB SERPL-MCNC: 0.5 MG/DL
BILIRUB UR QL STRIP.AUTO: ABNORMAL
BUN SERPL-MCNC: 5.4 MG/DL (ref 7–18.7)
CALCIUM SERPL-MCNC: 9.3 MG/DL (ref 8.4–10.2)
CHLORIDE SERPL-SCNC: 105 MMOL/L (ref 98–107)
CLARITY UR: ABNORMAL
CO2 SERPL-SCNC: 23 MMOL/L (ref 22–29)
COLOR UR AUTO: YELLOW
CREAT SERPL-MCNC: 0.64 MG/DL (ref 0.55–1.02)
CREAT/UREA NIT SERPL: 8
EOSINOPHIL # BLD AUTO: 0.02 X10(3)/MCL (ref 0–0.9)
EOSINOPHIL NFR BLD AUTO: 0.2 %
ERYTHROCYTE [DISTWIDTH] IN BLOOD BY AUTOMATED COUNT: 15.1 % (ref 11.5–17)
GFR SERPLBLD CREATININE-BSD FMLA CKD-EPI: >60 ML/MIN/1.73/M2
GLOBULIN SER-MCNC: 3.9 GM/DL (ref 2.4–3.5)
GLUCOSE SERPL-MCNC: 106 MG/DL (ref 74–100)
GLUCOSE UR QL STRIP: ABNORMAL
HCT VFR BLD AUTO: 36.4 % (ref 37–47)
HGB BLD-MCNC: 11.7 G/DL (ref 12–16)
HGB UR QL STRIP: NEGATIVE
IMM GRANULOCYTES # BLD AUTO: 0.05 X10(3)/MCL (ref 0–0.04)
IMM GRANULOCYTES NFR BLD AUTO: 0.5 %
KETONES UR QL STRIP: ABNORMAL
LEUKOCYTE ESTERASE UR QL STRIP: NEGATIVE
LYMPHOCYTES # BLD AUTO: 1.83 X10(3)/MCL (ref 0.6–4.6)
LYMPHOCYTES NFR BLD AUTO: 18.5 %
MCH RBC QN AUTO: 25.4 PG (ref 27–31)
MCHC RBC AUTO-ENTMCNC: 32.1 G/DL (ref 33–36)
MCV RBC AUTO: 79.1 FL (ref 80–94)
MONOCYTES # BLD AUTO: 0.46 X10(3)/MCL (ref 0.1–1.3)
MONOCYTES NFR BLD AUTO: 4.6 %
MUCOUS THREADS URNS QL MICRO: ABNORMAL /LPF
NEUTROPHILS # BLD AUTO: 7.52 X10(3)/MCL (ref 2.1–9.2)
NEUTROPHILS NFR BLD AUTO: 76 %
NITRITE UR QL STRIP: NEGATIVE
NRBC BLD AUTO-RTO: 0 %
PH UR STRIP: 6 [PH]
PLATELET # BLD AUTO: 231 X10(3)/MCL (ref 130–400)
PMV BLD AUTO: 11.5 FL (ref 7.4–10.4)
POTASSIUM SERPL-SCNC: 3.7 MMOL/L (ref 3.5–5.1)
PROT SERPL-MCNC: 7.2 GM/DL (ref 6.4–8.3)
PROT UR QL STRIP: ABNORMAL
RBC # BLD AUTO: 4.6 X10(6)/MCL (ref 4.2–5.4)
RBC #/AREA URNS AUTO: ABNORMAL /HPF
SODIUM SERPL-SCNC: 136 MMOL/L (ref 136–145)
SP GR UR STRIP.AUTO: 1.04 (ref 1–1.03)
SQUAMOUS #/AREA URNS LPF: ABNORMAL /HPF
UROBILINOGEN UR STRIP-ACNC: 2
WBC # BLD AUTO: 9.9 X10(3)/MCL (ref 4.5–11.5)
WBC #/AREA URNS AUTO: ABNORMAL /HPF

## 2025-06-23 PROCEDURE — 25000003 PHARM REV CODE 250: Performed by: NURSE PRACTITIONER

## 2025-06-23 PROCEDURE — 63600175 PHARM REV CODE 636 W HCPCS: Performed by: NURSE PRACTITIONER

## 2025-06-23 PROCEDURE — 25000003 PHARM REV CODE 250

## 2025-06-23 PROCEDURE — 96361 HYDRATE IV INFUSION ADD-ON: CPT

## 2025-06-23 PROCEDURE — 96374 THER/PROPH/DIAG INJ IV PUSH: CPT

## 2025-06-23 PROCEDURE — 81025 URINE PREGNANCY TEST: CPT

## 2025-06-23 PROCEDURE — 81001 URINALYSIS AUTO W/SCOPE: CPT

## 2025-06-23 PROCEDURE — 99285 EMERGENCY DEPT VISIT HI MDM: CPT | Mod: 25

## 2025-06-23 PROCEDURE — 85025 COMPLETE CBC W/AUTO DIFF WBC: CPT

## 2025-06-23 PROCEDURE — 80053 COMPREHEN METABOLIC PANEL: CPT

## 2025-06-23 PROCEDURE — 63600175 PHARM REV CODE 636 W HCPCS

## 2025-06-23 PROCEDURE — 96375 TX/PRO/DX INJ NEW DRUG ADDON: CPT

## 2025-06-23 RX ORDER — FAMOTIDINE 10 MG/ML
20 INJECTION, SOLUTION INTRAVENOUS
Status: COMPLETED | OUTPATIENT
Start: 2025-06-23 | End: 2025-06-23

## 2025-06-23 RX ORDER — METOCLOPRAMIDE HYDROCHLORIDE 5 MG/ML
10 INJECTION INTRAMUSCULAR; INTRAVENOUS
Status: COMPLETED | OUTPATIENT
Start: 2025-06-23 | End: 2025-06-23

## 2025-06-23 RX ORDER — CEPHALEXIN 500 MG/1
500 CAPSULE ORAL 4 TIMES DAILY
Qty: 28 CAPSULE | Refills: 0 | Status: SHIPPED | OUTPATIENT
Start: 2025-06-23 | End: 2025-06-30

## 2025-06-23 RX ORDER — ONDANSETRON HYDROCHLORIDE 2 MG/ML
4 INJECTION, SOLUTION INTRAVENOUS
Status: COMPLETED | OUTPATIENT
Start: 2025-06-23 | End: 2025-06-23

## 2025-06-23 RX ORDER — ONDANSETRON 4 MG/1
4 TABLET, ORALLY DISINTEGRATING ORAL EVERY 8 HOURS PRN
Qty: 16 TABLET | Refills: 0 | Status: SHIPPED | OUTPATIENT
Start: 2025-06-23 | End: 2025-07-03 | Stop reason: DRUGHIGH

## 2025-06-23 RX ADMIN — FAMOTIDINE 20 MG: 10 INJECTION, SOLUTION INTRAVENOUS at 09:06

## 2025-06-23 RX ADMIN — SODIUM CHLORIDE 1000 ML: 9 INJECTION, SOLUTION INTRAVENOUS at 09:06

## 2025-06-23 RX ADMIN — ONDANSETRON 4 MG: 2 INJECTION INTRAMUSCULAR; INTRAVENOUS at 07:06

## 2025-06-23 RX ADMIN — SODIUM CHLORIDE 1000 ML: 9 INJECTION, SOLUTION INTRAVENOUS at 07:06

## 2025-06-23 RX ADMIN — METOCLOPRAMIDE 10 MG: 5 INJECTION, SOLUTION INTRAMUSCULAR; INTRAVENOUS at 09:06

## 2025-06-23 NOTE — FIRST PROVIDER EVALUATION
"Medical screening examination initiated.  I have conducted a focused provider triage encounter, findings are as follows:    Brief history of present illness:  30 year old female who is approximately 11 weeks pregnant with twins () presents to ER with N/V. +abdominal pain     Vitals:    25 1737 25 1739   BP:  116/73   BP Location: Left arm    Pulse: 105    Resp: 20    Temp: 98.2 °F (36.8 °C)    TempSrc: Oral    SpO2: 100%    Weight: 117.9 kg (260 lb)    Height: 5' 7" (1.702 m)        Pertinent physical exam:  awake and alert, nad    Brief workup plan:  labs, ua     Preliminary workup initiated; this workup will be continued and followed by the physician or advanced practice provider that is assigned to the patient when roomed.  "

## 2025-06-24 NOTE — ED PROVIDER NOTES
Encounter Date: 2025       History     Chief Complaint   Patient presents with    Nausea    Vomiting    Abdominal Pain     Pt states she is 11 weeks gestation with twins, . Pt  nausea and vomiting for a while with abd cramping     See MDM    The history is provided by the patient. No  was used.     Review of patient's allergies indicates:   Allergen Reactions    Amoxicillin Rash     As a baby     Past Medical History:   Diagnosis Date    Acne 2016    Anxiety and depression     Bipolar disorder, unspecified     Dyspareunia     GERD (gastroesophageal reflux disease) 2019    Gestational hypertension affecting fifth pregnancy     Hyperemesis gravidarum     Obesity 2010    Seasonal allergies     Sleep apnea     Ulcer      Past Surgical History:   Procedure Laterality Date    CHOLECYSTECTOMY  2020    EGD, WITH CLOSED BIOPSY  2024    Procedure: EGD, WITH CLOSED BIOPSY;  Surgeon: Florin Ferraro MD;  Location: [facility] ENDOSCOPY;  Service: Gastroenterology;;    ESOPHAGEAL MOTILITY STUDY USING HIGH RESOLUTION MANOMETRY N/A 2023    Procedure: 730am MOTILITY w/ RAPID SWALLOW;  Surgeon: Florin Ferraro MD;  Location: [facility] ENDOSCOPY;  Service: Gastroenterology;  Laterality: N/A;  Scheduled for 30    ESOPHAGOGASTRODUODENOSCOPY N/A 2024    Procedure: BRAVO 96 HR;  Surgeon: Florin Ferraro MD;  Location: [facility] ENDOSCOPY;  Service: Gastroenterology;  Laterality: N/A;  BRAVO placed @ 31cm @ 1253am    INSERTION OF PH PROBE N/A 2024    Procedure: BRAVO;  Surgeon: Florin Ferraro MD;  Location: [facility] ENDOSCOPY;  Service: Gastroenterology;  Laterality: N/A;    TONSILLECTOMY AND ADENOIDECTOMY       Family History   Problem Relation Name Age of Onset    Diabetes Maternal Grandfather Mariano     Breast cancer Maternal Grandmother Candy Pedroza     Cancer Maternal Grandmother Candy Pedroza     Breast cancer Maternal Aunt Shelby     Cancer Maternal Aunt Shelby     Drug  abuse Father Félix      Social History[1]  Review of Systems   Constitutional:  Negative for fever.   Respiratory:  Negative for cough and shortness of breath.    Cardiovascular:  Negative for chest pain.   Gastrointestinal:  Positive for nausea and vomiting. Negative for abdominal pain.   Genitourinary:  Negative for difficulty urinating and dysuria.   Musculoskeletal:  Negative for gait problem.   Skin:  Negative for color change.   Neurological:  Negative for dizziness, speech difficulty and headaches.   Psychiatric/Behavioral:  Negative for hallucinations and suicidal ideas.    All other systems reviewed and are negative.      Physical Exam     Initial Vitals   BP Pulse Resp Temp SpO2   06/23/25 1739 06/23/25 1737 06/23/25 1737 06/23/25 1737 06/23/25 1737   116/73 105 20 98.2 °F (36.8 °C) 100 %      MAP       --                Physical Exam    Nursing note and vitals reviewed.  Constitutional: She appears well-developed and well-nourished.   HENT:   Head: Normocephalic.   Eyes: EOM are normal.   Neck:   Normal range of motion.  Cardiovascular:  Normal rate, regular rhythm, normal heart sounds and intact distal pulses.           Pulmonary/Chest: Breath sounds normal. No respiratory distress.   Abdominal: Abdomen is soft. Bowel sounds are normal. There is no abdominal tenderness.   Musculoskeletal:         General: Normal range of motion.      Cervical back: Normal range of motion.     Neurological: She is alert and oriented to person, place, and time. She has normal strength.   Skin: Skin is warm and dry.   Psychiatric: She has a normal mood and affect. Her behavior is normal. Judgment and thought content normal.         ED Course   Procedures  Labs Reviewed   COMPREHENSIVE METABOLIC PANEL - Abnormal       Result Value    Sodium 136      Potassium 3.7      Chloride 105      CO2 23      Glucose 106 (*)     Blood Urea Nitrogen 5.4 (*)     Creatinine 0.64      Calcium 9.3      Protein Total 7.2      Albumin 3.3 (*)      Globulin 3.9 (*)     Albumin/Globulin Ratio 0.8 (*)     Bilirubin Total 0.5      ALP 66      ALT 21      AST 18      eGFR >60      Anion Gap 8.0      BUN/Creatinine Ratio 8     URINALYSIS, REFLEX TO URINE CULTURE - Abnormal    Color, UA Yellow      Appearance, UA Turbid (*)     Specific Gravity, UA 1.036 (*)     pH, UA 6.0      Protein, UA 1+ (*)     Glucose, UA Trace (*)     Ketones, UA 1+ (*)     Blood, UA Negative      Bilirubin, UA 1+ (*)     Urobilinogen, UA 2.0 (*)     Nitrites, UA Negative      Leukocyte Esterase, UA Negative      RBC, UA 6-10 (*)     WBC, UA 0-5      Bacteria, UA Moderate (*)     Squamous Epithelial Cells, UA Few (*)     Mucous, UA Many (*)    CBC WITH DIFFERENTIAL - Abnormal    WBC 9.90      RBC 4.60      Hgb 11.7 (*)     Hct 36.4 (*)     MCV 79.1 (*)     MCH 25.4 (*)     MCHC 32.1 (*)     RDW 15.1      Platelet 231      MPV 11.5 (*)     Neut % 76.0      Lymph % 18.5      Mono % 4.6      Eos % 0.2      Basophil % 0.2      Imm Grans % 0.5      Neut # 7.52      Lymph # 1.83      Mono # 0.46      Eos # 0.02      Baso # 0.02      Imm Gran # 0.05 (*)     NRBC% 0.0     PREGNANCY TEST, URINE RAPID - Abnormal    hCG Qualitative, Urine Positive (*)    CBC W/ AUTO DIFFERENTIAL    Narrative:     The following orders were created for panel order CBC auto differential.  Procedure                               Abnormality         Status                     ---------                               -----------         ------                     CBC with Differential[[id number]]       Abnormal            Final result                 Please view results for these tests on the individual orders.          Imaging Results              US OB <14 Wks, TransAbd, Single Gestation (Final result)  Result time 06/23/25 19:06:11      Final result by Ralph Jimenez MD (06/23/25 19:06:11)                   Impression:      Twin pregnancy with average ultrasound age 11 weeks 0 days.      Electronically signed  by: Ralph Jimenez  Date:    06/23/2025  Time:    19:06               Narrative:    EXAMINATION:  US OB <14 WEEKS TRANSABDOM, SINGLE GESTATION    CLINICAL HISTORY:  Other specified pregnancy related conditions, unspecified trimester    TECHNIQUE:  Transabdominal ultrasound of the pelvis.    COMPARISON:  No relevant comparison studies available at the time of dictation    FINDINGS:  Due to vomiting, patient unable to tolerate transvaginal imaging.    Twin pregnancy with 4 mm inter twin membrane.  Baby A crown-rump length 40.3 mm.  Average ultrasound age 11 weeks 0 days +/-1 weeks 0 days with [facility] 01/12/2026.  Fetal heart rate for baby A 171 beats per minute.  Single deepest pocket of amniotic fluid is 2.9 cm.    Baby B has crown-rump length of 40.5 mm.  Average ultrasound age is also 11 weeks 0 days +/-1 weeks 0 days with [facility] 01/12/2026.  Baby B fetal heart rate 171 beats per minute.  Single deepest pocket measures 2.7 cm.    Left ovary has normal appearance.  Right ovary not visualized, felt to be obscured by adjacent bowel.    No significant pelvic ascites.    Measurements:    Uterus: 11.8 x 7.9 x 8.7 cm    Right ovary: Not seen    Left ovary: 2.7 x 1.9 x 2.8 cm                                       Medications   sodium chloride 0.9% bolus 1,000 mL 1,000 mL (0 mLs Intravenous Stopped 6/23/25 2008)   ondansetron injection 4 mg (4 mg Intravenous Given 6/23/25 1909)   sodium chloride 0.9% bolus 1,000 mL 1,000 mL (0 mLs Intravenous Stopped 6/23/25 2216)   metoclopramide injection 10 mg (10 mg Intravenous Given 6/23/25 2120)   famotidine (PF) injection 20 mg (20 mg Intravenous Given 6/23/25 2120)     Medical Decision Making  Historian:  Patient.  Patient is a White 30 y.o. female that presents with abdominal cramping with nausea and vomiting that has been present few days. Associated symptoms nothing. Surrounding information is nothing. Exacerbated by nothing. Relieved by nothing. Patient treatment prior to arrival none.  Risk factors include none. Other history pertaining to this complaint nothing.   Assessment:  See physical exam.  DD:  Vomiting in pregnancy, hyperemesis gravidarum  ED Course: History was obtained.  Physical was performed.  Patient's labs unremarkable except for UTI. Medical or surgical consults:  None. Social determinants that affect healthcare:  None.       Amount and/or Complexity of Data Reviewed  Labs:      Details: UTI  Radiology:      Details: Ultrasound no acute finding    Risk  Prescription drug management.  Risk Details: Zofran and Keflex                                      Clinical Impression:  Final diagnoses:  [O26.899, R10.9] Abdominal pain during pregnancy  [R11.2] Nausea and vomiting, unspecified vomiting type (Primary)  [N39.0] Urinary tract infection without hematuria, site unspecified          ED Disposition Condition    Discharge Stable          ED Prescriptions       Medication Sig Dispense Start Date End Date Auth. Provider    ondansetron (ZOFRAN-ODT) 4 MG TbDL Take 1 tablet (4 mg total) by mouth every 8 (eight) hours as needed (vomiting). 16 tablet 6/23/2025 -- Toribio Gomez FNP    cephALEXin (KEFLEX) 500 MG capsule Take 1 capsule (500 mg total) by mouth 4 (four) times daily. for 7 days 28 capsule 6/23/2025 6/30/2025 Toribio Gomez FNP          Follow-up Information       Follow up With Specialties Details Why Contact Info    Your Obstetrician/Gynecologist  Call in 3 days ed follow up                    [1]   Social History  Tobacco Use    Smoking status: Never    Smokeless tobacco: Never   Vaping Use    Vaping status: Never Used   Substance Use Topics    Alcohol use: Not Currently    Drug use: Never        Toribio Gomez FNP  06/23/25 8804

## 2025-07-02 ENCOUNTER — HOSPITAL ENCOUNTER (EMERGENCY)
Facility: HOSPITAL | Age: 30
Discharge: HOME | End: 2025-07-02
Attending: EMERGENCY MEDICINE
Payer: MEDICAID

## 2025-07-02 VITALS
WEIGHT: 252 LBS | OXYGEN SATURATION: 98 % | SYSTOLIC BLOOD PRESSURE: 120 MMHG | HEIGHT: 68 IN | TEMPERATURE: 98 F | BODY MASS INDEX: 38.19 KG/M2 | DIASTOLIC BLOOD PRESSURE: 75 MMHG | HEART RATE: 102 BPM | RESPIRATION RATE: 18 BRPM

## 2025-07-02 DIAGNOSIS — O21.0 HYPEREMESIS GRAVIDARUM: Primary | ICD-10-CM

## 2025-07-02 LAB
ALBUMIN SERPL-MCNC: 3.4 G/DL (ref 3.5–5)
ALBUMIN/GLOB SERPL: 0.8 RATIO (ref 1.1–2)
ALP SERPL-CCNC: 60 UNIT/L (ref 40–150)
ALT SERPL-CCNC: 20 UNIT/L (ref 0–55)
ANION GAP SERPL CALC-SCNC: 10 MEQ/L
AST SERPL-CCNC: 17 UNIT/L (ref 11–45)
BASOPHILS # BLD AUTO: 0.02 X10(3)/MCL
BASOPHILS NFR BLD AUTO: 0.2 %
BILIRUB SERPL-MCNC: 0.6 MG/DL
BILIRUB UR QL STRIP.AUTO: NEGATIVE
BUN SERPL-MCNC: 5.1 MG/DL (ref 7–18.7)
CALCIUM SERPL-MCNC: 9.1 MG/DL (ref 8.4–10.2)
CHLORIDE SERPL-SCNC: 106 MMOL/L (ref 98–107)
CLARITY UR: CLEAR
CO2 SERPL-SCNC: 22 MMOL/L (ref 22–29)
COLOR UR AUTO: YELLOW
CREAT SERPL-MCNC: 0.6 MG/DL (ref 0.55–1.02)
CREAT/UREA NIT SERPL: 9
EOSINOPHIL # BLD AUTO: 0.02 X10(3)/MCL (ref 0–0.9)
EOSINOPHIL NFR BLD AUTO: 0.2 %
ERYTHROCYTE [DISTWIDTH] IN BLOOD BY AUTOMATED COUNT: 15.2 % (ref 11.5–17)
GFR SERPLBLD CREATININE-BSD FMLA CKD-EPI: >60 ML/MIN/1.73/M2
GLOBULIN SER-MCNC: 4.2 GM/DL (ref 2.4–3.5)
GLUCOSE SERPL-MCNC: 99 MG/DL (ref 74–100)
GLUCOSE UR QL STRIP: NEGATIVE
HCT VFR BLD AUTO: 37.4 % (ref 37–47)
HGB BLD-MCNC: 12.3 G/DL (ref 12–16)
HGB UR QL STRIP: NEGATIVE
IMM GRANULOCYTES # BLD AUTO: 0.04 X10(3)/MCL (ref 0–0.04)
IMM GRANULOCYTES NFR BLD AUTO: 0.4 %
KETONES UR QL STRIP: >=80
LEUKOCYTE ESTERASE UR QL STRIP: NEGATIVE
LYMPHOCYTES # BLD AUTO: 1.65 X10(3)/MCL (ref 0.6–4.6)
LYMPHOCYTES NFR BLD AUTO: 17.6 %
MAGNESIUM SERPL-MCNC: 1.8 MG/DL (ref 1.6–2.6)
MCH RBC QN AUTO: 26 PG (ref 27–31)
MCHC RBC AUTO-ENTMCNC: 32.9 G/DL (ref 33–36)
MCV RBC AUTO: 79.1 FL (ref 80–94)
MONOCYTES # BLD AUTO: 0.33 X10(3)/MCL (ref 0.1–1.3)
MONOCYTES NFR BLD AUTO: 3.5 %
NEUTROPHILS # BLD AUTO: 7.33 X10(3)/MCL (ref 2.1–9.2)
NEUTROPHILS NFR BLD AUTO: 78.1 %
NITRITE UR QL STRIP: NEGATIVE
NRBC BLD AUTO-RTO: 0 %
PH UR STRIP: 6 [PH]
PLATELET # BLD AUTO: 211 X10(3)/MCL (ref 130–400)
PMV BLD AUTO: 11.1 FL (ref 7.4–10.4)
POTASSIUM SERPL-SCNC: 3.7 MMOL/L (ref 3.5–5.1)
PROT SERPL-MCNC: 7.6 GM/DL (ref 6.4–8.3)
PROT UR QL STRIP: NEGATIVE
RBC # BLD AUTO: 4.73 X10(6)/MCL (ref 4.2–5.4)
SODIUM SERPL-SCNC: 138 MMOL/L (ref 136–145)
SP GR UR STRIP.AUTO: 1.02 (ref 1–1.03)
UROBILINOGEN UR STRIP-ACNC: 0.2
WBC # BLD AUTO: 9.39 X10(3)/MCL (ref 4.5–11.5)

## 2025-07-02 PROCEDURE — 80053 COMPREHEN METABOLIC PANEL: CPT | Performed by: EMERGENCY MEDICINE

## 2025-07-02 PROCEDURE — 85025 COMPLETE CBC W/AUTO DIFF WBC: CPT | Performed by: EMERGENCY MEDICINE

## 2025-07-02 PROCEDURE — 99284 EMERGENCY DEPT VISIT MOD MDM: CPT

## 2025-07-02 PROCEDURE — 96375 TX/PRO/DX INJ NEW DRUG ADDON: CPT

## 2025-07-02 PROCEDURE — 63600175 PHARM REV CODE 636 W HCPCS: Performed by: EMERGENCY MEDICINE

## 2025-07-02 PROCEDURE — 96374 THER/PROPH/DIAG INJ IV PUSH: CPT

## 2025-07-02 PROCEDURE — 83735 ASSAY OF MAGNESIUM: CPT | Performed by: EMERGENCY MEDICINE

## 2025-07-02 PROCEDURE — 96361 HYDRATE IV INFUSION ADD-ON: CPT

## 2025-07-02 PROCEDURE — 81003 URINALYSIS AUTO W/O SCOPE: CPT | Performed by: EMERGENCY MEDICINE

## 2025-07-02 RX ORDER — ONDANSETRON HYDROCHLORIDE 2 MG/ML
4 INJECTION, SOLUTION INTRAVENOUS
Status: COMPLETED | OUTPATIENT
Start: 2025-07-02 | End: 2025-07-02

## 2025-07-02 RX ORDER — FAMOTIDINE 10 MG/ML
20 INJECTION, SOLUTION INTRAVENOUS
Status: COMPLETED | OUTPATIENT
Start: 2025-07-02 | End: 2025-07-02

## 2025-07-02 RX ADMIN — SODIUM CHLORIDE, POTASSIUM CHLORIDE, SODIUM LACTATE AND CALCIUM CHLORIDE 1000 ML: 600; 310; 30; 20 INJECTION, SOLUTION INTRAVENOUS at 03:07

## 2025-07-02 RX ADMIN — SODIUM CHLORIDE, POTASSIUM CHLORIDE, SODIUM LACTATE AND CALCIUM CHLORIDE 1000 ML: 600; 310; 30; 20 INJECTION, SOLUTION INTRAVENOUS at 04:07

## 2025-07-02 RX ADMIN — FAMOTIDINE 20 MG: 10 INJECTION, SOLUTION INTRAVENOUS at 03:07

## 2025-07-02 RX ADMIN — ONDANSETRON 4 MG: 2 INJECTION INTRAMUSCULAR; INTRAVENOUS at 03:07

## 2025-07-02 NOTE — ED PROVIDER NOTES
Encounter Date: 7/2/2025       History     Chief Complaint   Patient presents with    Vomiting     States I'm pregnant and been vomiting and can't hold anything down     The history is provided by the patient.   Emesis   This is a recurrent problem. The current episode started yesterday. The problem has been unchanged. The emesis has an appearance of stomach contents. Pertinent negatives include no abdominal pain, no diarrhea and no fever.   Patient is 12-13 weeks gestation with twins and has been dealing with N/V throughout the pregnancy.  Suffered with hyperemesis during her first pregnancy also.    Review of patient's allergies indicates:   Allergen Reactions    Amoxicillin Rash     As a baby     Past Medical History:   Diagnosis Date    Acne 2016    Anxiety and depression     Bipolar disorder, unspecified     Dyspareunia     GERD (gastroesophageal reflux disease) 2019    Gestational hypertension affecting fifth pregnancy     Hyperemesis gravidarum     Obesity 2010    Seasonal allergies 2013    Sleep apnea 2020    Ulcer 2020     Past Surgical History:   Procedure Laterality Date    CHOLECYSTECTOMY  11/2020    EGD, WITH CLOSED BIOPSY  07/02/2024    Procedure: EGD, WITH CLOSED BIOPSY;  Surgeon: Florin Ferraro MD;  Location: [facility] ENDOSCOPY;  Service: Gastroenterology;;    ESOPHAGEAL MOTILITY STUDY USING HIGH RESOLUTION MANOMETRY N/A 01/18/2023    Procedure: 730am MOTILITY w/ RAPID SWALLOW;  Surgeon: Florin Ferraro MD;  Location: [facility] ENDOSCOPY;  Service: Gastroenterology;  Laterality: N/A;  Scheduled for 0730    ESOPHAGOGASTRODUODENOSCOPY N/A 07/02/2024    Procedure: BRAVO 96 HR;  Surgeon: Florin Ferraro MD;  Location: [facility] ENDOSCOPY;  Service: Gastroenterology;  Laterality: N/A;  BRAVO placed @ 31cm @ 1253am    INSERTION OF PH PROBE N/A 07/02/2024    Procedure: BRAVO;  Surgeon: Florin Ferraro MD;  Location: [facility] ENDOSCOPY;  Service: Gastroenterology;  Laterality: N/A;    TONSILLECTOMY AND  ADENOIDECTOMY       Family History   Problem Relation Name Age of Onset    Diabetes Maternal Grandfather Mariano     Breast cancer Maternal Grandmother Candy Pedroza     Cancer Maternal Grandmother Candy Pedroza     Breast cancer Maternal Aunt Shelby     Cancer Maternal Aunt Shelby     Drug abuse Father Félix      Social History[1]  Review of Systems   Constitutional:  Negative for fever.   HENT:  Negative for sore throat.    Respiratory:  Negative for shortness of breath.    Cardiovascular:  Negative for chest pain.   Gastrointestinal:  Positive for vomiting. Negative for abdominal pain, diarrhea and nausea.   Genitourinary:  Negative for dysuria.   Musculoskeletal:  Negative for back pain.   Skin:  Negative for rash.   Neurological:  Negative for weakness.   Hematological:  Does not bruise/bleed easily.       Physical Exam     Initial Vitals [07/02/25 1448]   BP Pulse Resp Temp SpO2   (!) 156/81 102 18 98.2 °F (36.8 °C) 98 %      MAP       --         Physical Exam    Nursing note and vitals reviewed.  Constitutional: She appears well-developed and well-nourished.   HENT:   Head: Normocephalic and atraumatic.   Right Ear: External ear normal.   Left Ear: External ear normal.   Eyes: Conjunctivae and EOM are normal. Pupils are equal, round, and reactive to light.   Neck: Neck supple.   Normal range of motion.  Cardiovascular:  Regular rhythm, normal heart sounds and intact distal pulses.   Tachycardia present.         Pulmonary/Chest: Breath sounds normal.   Abdominal: Abdomen is soft. Bowel sounds are normal.   obese   Musculoskeletal:         General: Normal range of motion.      Cervical back: Normal range of motion and neck supple.     Neurological: She is alert and oriented to person, place, and time. GCS score is 15. GCS eye subscore is 4. GCS verbal subscore is 5. GCS motor subscore is 6.   Skin: Skin is warm and dry. Capillary refill takes less than 2 seconds.   Multiple tattoos   Psychiatric: She has a normal mood  and affect. Her behavior is normal. Judgment and thought content normal.         ED Course   Procedures  Labs Reviewed   COMPREHENSIVE METABOLIC PANEL - Abnormal       Result Value    Sodium 138      Potassium 3.7      Chloride 106      CO2 22      Glucose 99      Blood Urea Nitrogen 5.1 (*)     Creatinine 0.60      Calcium 9.1      Protein Total 7.6      Albumin 3.4 (*)     Globulin 4.2 (*)     Albumin/Globulin Ratio 0.8 (*)     Bilirubin Total 0.6      ALP 60      ALT 20      AST 17      eGFR >60      Anion Gap 10.0      BUN/Creatinine Ratio 9     URINALYSIS, REFLEX TO URINE CULTURE - Abnormal    Color, UA Yellow      Appearance, UA Clear      Specific Gravity, UA 1.025      pH, UA 6.0      Protein, UA Negative      Glucose, UA Negative      Ketones, UA >=80 (*)     Blood, UA Negative      Bilirubin, UA Negative      Urobilinogen, UA 0.2      Nitrites, UA Negative      Leukocyte Esterase, UA Negative     CBC WITH DIFFERENTIAL - Abnormal    WBC 9.39      RBC 4.73      Hgb 12.3      Hct 37.4      MCV 79.1 (*)     MCH 26.0 (*)     MCHC 32.9 (*)     RDW 15.2      Platelet 211      MPV 11.1 (*)     Neut % 78.1      Lymph % 17.6      Mono % 3.5      Eos % 0.2      Basophil % 0.2      Imm Grans % 0.4      Neut # 7.33      Lymph # 1.65      Mono # 0.33      Eos # 0.02      Baso # 0.02      Imm Gran # 0.04      NRBC% 0.0     MAGNESIUM - Normal    Magnesium Level 1.80     CBC W/ AUTO DIFFERENTIAL    Narrative:     The following orders were created for panel order CBC auto differential.  Procedure                               Abnormality         Status                     ---------                               -----------         ------                     CBC with Differential[[id number]]       Abnormal            Final result                 Please view results for these tests on the individual orders.          Imaging Results    None          Medications   lactated ringers bolus 1,000 mL (1,000 mLs Intravenous New Bag  7/2/25 1629)   lactated ringers bolus 1,000 mL (0 mLs Intravenous Stopped 7/2/25 1614)   ondansetron injection 4 mg (4 mg Intravenous Given 7/2/25 1506)   famotidine (PF) injection 20 mg (20 mg Intravenous Given 7/2/25 1515)     Medical Decision Making  Amount and/or Complexity of Data Reviewed  Labs: ordered. Decision-making details documented in ED Course.    Risk  Prescription drug management.    Differential includes:  hyperemesis gravidarum, dehydration, electrolyte disturbance.  Will obtain CBC, CMP, mag, UA and give IVF bolus and antiemetic.                                  Clinical Impression:  Final diagnoses:  [O21.0] Hyperemesis gravidarum (Primary)          ED Disposition Condition    Discharge Stable          ED Prescriptions    None       Follow-up Information       Follow up With Specialties Details Why Contact Info    Yaritza Barreto MD Maternal and Fetal Medicine, Perinatology Schedule an appointment as soon as possible for a visit   [address]  [address]  [facility] [address]  [phone]                     [1]   Social History  Tobacco Use    Smoking status: Never    Smokeless tobacco: Never   Vaping Use    Vaping status: Never Used   Substance Use Topics    Alcohol use: Not Currently    Drug use: Never        Christiano Ordoñez MD  07/02/25 3960

## 2025-07-15 ENCOUNTER — OFFICE VISIT (OUTPATIENT)
Dept: MATERNAL FETAL MEDICINE | Facility: CLINIC | Age: 30
End: 2025-07-15
Payer: MEDICAID

## 2025-07-15 ENCOUNTER — PROCEDURE VISIT (OUTPATIENT)
Dept: MATERNAL FETAL MEDICINE | Facility: CLINIC | Age: 30
End: 2025-07-15
Payer: MEDICAID

## 2025-07-15 VITALS
WEIGHT: 246.06 LBS | BODY MASS INDEX: 37.29 KG/M2 | SYSTOLIC BLOOD PRESSURE: 100 MMHG | HEIGHT: 68 IN | DIASTOLIC BLOOD PRESSURE: 72 MMHG

## 2025-07-15 DIAGNOSIS — O21.0 HYPEREMESIS AFFECTING PREGNANCY, ANTEPARTUM: ICD-10-CM

## 2025-07-15 DIAGNOSIS — O99.212 SEVERE OBESITY DUE TO EXCESS CALORIES AFFECTING PREGNANCY IN SECOND TRIMESTER: ICD-10-CM

## 2025-07-15 DIAGNOSIS — Z87.59 HISTORY OF GESTATIONAL HYPERTENSION: ICD-10-CM

## 2025-07-15 DIAGNOSIS — O30.049 DICHORIONIC DIAMNIOTIC TWIN PREGNANCY, ANTEPARTUM: ICD-10-CM

## 2025-07-15 DIAGNOSIS — O99.342 MENTAL DISORDER AFFECTING PREGNANCY IN SECOND TRIMESTER: Primary | ICD-10-CM

## 2025-07-15 DIAGNOSIS — E66.01 SEVERE OBESITY DUE TO EXCESS CALORIES AFFECTING PREGNANCY IN SECOND TRIMESTER: ICD-10-CM

## 2025-07-15 PROCEDURE — 3078F DIAST BP <80 MM HG: CPT | Mod: CPTII,S$GLB,, | Performed by: OBSTETRICS & GYNECOLOGY

## 2025-07-15 PROCEDURE — 3074F SYST BP LT 130 MM HG: CPT | Mod: CPTII,S$GLB,, | Performed by: OBSTETRICS & GYNECOLOGY

## 2025-07-15 PROCEDURE — 76805 OB US >/= 14 WKS SNGL FETUS: CPT | Mod: S$GLB,,, | Performed by: OBSTETRICS & GYNECOLOGY

## 2025-07-15 PROCEDURE — 3008F BODY MASS INDEX DOCD: CPT | Mod: CPTII,S$GLB,, | Performed by: OBSTETRICS & GYNECOLOGY

## 2025-07-15 PROCEDURE — 99214 OFFICE O/P EST MOD 30 MIN: CPT | Mod: TH,25,S$GLB, | Performed by: OBSTETRICS & GYNECOLOGY

## 2025-07-15 PROCEDURE — 76810 OB US >/= 14 WKS ADDL FETUS: CPT | Mod: S$GLB,,, | Performed by: OBSTETRICS & GYNECOLOGY

## 2025-07-15 PROCEDURE — 1160F RVW MEDS BY RX/DR IN RCRD: CPT | Mod: CPTII,S$GLB,, | Performed by: OBSTETRICS & GYNECOLOGY

## 2025-07-15 PROCEDURE — 1159F MED LIST DOCD IN RCRD: CPT | Mod: CPTII,S$GLB,, | Performed by: OBSTETRICS & GYNECOLOGY

## 2025-07-15 RX ORDER — CHLORPROMAZINE HYDROCHLORIDE 25 MG/1
25 TABLET, FILM COATED ORAL 3 TIMES DAILY PRN
Qty: 90 TABLET | Refills: 11 | Status: SHIPPED | OUTPATIENT
Start: 2025-07-15 | End: 2026-07-15

## 2025-07-15 NOTE — PROGRESS NOTES
"Maternal Fetal Medicine follow up consult    SUBJECTIVE:     Tressa Sheppard is a 30 y.o.  female with IUP at 14w1d who is seen in follow up consultation by MFM.  Pregnancy complications include:   Problem   -Bipolar, anxiety, depression affecting pregnancy in second trimester   -BMI affecting pregnancy in second trimester   - Dichorionic diamniotic twin pregnancy, antepartum   - History of gestational hypertension   - Hyperemesis affecting pregnancy, antepartum     Tressa presents for routine follow up appointment.  Has complaints of severe nausea and vomiting over past two weeks. Has received IV fluids numerous times for dehydration. Home health should be going to her house soon to start IV fluids at home. Unable to fill Zofran as her insurance will not pay for any more this month. She feels as though the Scopolamine patch is making her nausea worse. Not taking Colace/Miralax.   Tried restarting Cymbalta but unable to hold it down.  Denies LOF, VB, contractions. Positive fetal movement.    Previous notes reviewed.   No changes to medical, surgical, family, social, or obstetric history.  Interval history since last MFM visit: see above  Medications reviewed.    Care team members:  Dr Geiger - Primary OB     OBJECTIVE:   /72 (BP Location: Right arm, Patient Position: Sitting)   Ht 5' 8" (1.727 m)   Wt 111.6 kg (246 lb 0.5 oz)   LMP 2025 (Approximate)   Breastfeeding Unknown   BMI 37.41 kg/m²     Ultrasound performed. See viewpoint for full ultrasound report.    A viable dichorionic- diamniotic pregnancy is visualized with thick dividing membrane/ lambda sign. Both fetuses appear normal without abnormalities. Estimated fetal weights for the fetuses are 100g and 100g (appropriate for GA and congruent). Amniotic fluid volume is normal for both. Early anatomic assessments are reassuring.  Placental locations are anterior without evidence of previa.     ASSESSMENT/PLAN:     30 y.o.  " female with IUP at 14w1d    -Bipolar, anxiety, depression affecting pregnancy in second trimester  She reports a history of anxiety, depression, and bipolar. She is not taking medication daily. She was taking Cymbalta prior to pregnancy but discontinued at +UPT. She is interested in restarting her medication because she feels the symptoms are returning. Dr Geiger has advised her to restart and she plans to do so. We discussed the usage of Cymbalta in pregnancy and the minimal risks associated with the fetus. Discussed increased risk for peripartum and postpartum mood disorders. Precautions provided.     It is important to optimize mental health conditions during pregnancy in an effort to reduce the risk of postpartum mood disorders. There are options for management including psychotherapy, counseling, cognitive behavioral therapy, exercise/yoga, journaling, and psychiatry services.     7/15/25- Tried restarting Cymbalta, however, has been having hyperemesis and unable to keep it down. Quit trying to take it. Endorses depression symptoms and states she'll be starting therapy through her insurance (telehealth visits once weekly). She's looking forward to this treatment option.  Discontinue Reglan which may be affecting her mood.      -BMI affecting pregnancy in second trimester  Please see prior documentation for specific counseling.      BMI 40    Recommendations:  TWG goal is 11-20 lbs  Screen for signs/symptoms of obstructive sleep apnea  Nutritionist consult offered (this is to be ordered by primary OB provider)  Consider early 1 hr GCT (between 14-16 weeks gestation); repeat at 24-28 weeks gestation  Lovenox 40 mg BID for VTE prophylaxis while admitted to the hospital (antepartum or postpartum) if BMI >= 40.   Encouraged breastfeeding  Postpartum lifestyle modifications & weight loss      - Hyperemesis affecting pregnancy, antepartum  We have discussed the complex etiology of hyperemesis in pregnancy. She reports  having similar (although slightly worse) symptoms in her previous pregnancy.   Her current regimen is as follows:  -Famotidine daily  -Unisom/B6 daily   -Reglan TID  -Zofran Q6h  -Scopolamine patches  -Phenergan supp PRN (although does not use)    She admits to not hydrating well even prior to pregnancy. She reports not hydrating well currently due to constant nausea. Encouraged PO hydration. If unable to hydrate adequately, may consider Home Health for IV fluids.   She also has complaints of severe constipation given her medication regimen and inability to hydrate. Recommend Colace BID and Miralax daily.    7/15/25- Admits to feeling better so forgot to take meds as scheduled at which time she began experiencing severe nausea and vomiting. She states she's had to receive IV fluids through urgent care/ED due to dehydration. Home Health will be going to her home this Friday to start IV fluids at home to stay ahead. Currently taking Reglan TID, Unisom/B6, Benadryl QHS, Reglan TID, Zofran (although currently out of Rx and unable to fill until end of month), Prilosec, and Scopolamine patches (feels as though patches are making N/V worse). She was prescribed Tigan but was unable to fill this at her pharmacy. She did not start Colace or Miralax d/t complaints of constipation last visit. States constipation has improved.  Changes-I recommend she discontinue Reglan as her mood has been more depressed.  I recommend starting Thorazine and this was sent to the pharmacy.          - History of gestational hypertension  I reviewed the patient's obstetric history which is remarkable for development of gHTN at term. We reviewed the risk of recurrence in subsequent pregnancies. Subsequent pregnancies in women with severe preeclampsia are at risk of other  complications including abruption,  birth, FGR, and increased  mortality. The patient's blood pressure normalized following delivery. We reviewed the role  of low dose aspirin therapy in regards to preeclampsia risk reduction in subsequent pregnancies.    Recommendations:  Start low dose aspirin at 12-16 weeks for preeclampsia risk reduction  Consider baseline preeclampsia studies: (CMP, CBC, urine protein/creatinine ratio)  Close surveillance for signs/symptoms of preeclampsia in second/third trimester and postpartum period      - Dichorionic diamniotic twin pregnancy, antepartum  Prevouisly discussed with the patient the finding of a dichorionic-diamniotic twin pregnancy and the risks associated with this type of pregnancy. I counseled her on the increased incidence of preeclampsia/gestational hypertension, gestational diabetes, fetal growth restriction, anemia, congenital anomalies, need for antepartum hospitalization,  labor/PPROM, stillbirth, and risk of postpartum hemorrhage that can occur in twin pregnancies. We discussed plans for monthly ultrasound surveillance looking for signs of fetal growth restriction.     Recommendations:   Consider transvaginal cervical length screening at time of targeted anatomy ultrasound w/ MFM (scheduled with MFM)  Detailed anatomy surveys at 19-20 weeks (scheduled with MFM)  Fetal growth ultrasounds every 3-4 weeks starting at 23-24 weeks (scheduled with MFM)  Low-dose aspirin daily (81 mg) beginning at 12-16 weeks to reduce the risk of preeclampsia  Folic acid 1 mg daily and ferrous sulfate 325 mg daily  Increase daily dietary intake by approximately 300 kcal above that for a meadows pregnancy, or 600 kcal over that of a nonpregnant woman and monitor weight gain   testing with weekly NST and MVP assessment beginning at 32 weeks (this is to be ordered by primary OB provider)  Given the increased risks of a twin pregnancy, prenatal visits every 2-3 weeks from 24 - 32/34 weeks and weekly prenatal visits thereafter    Delivery timing:   Normal growth and testin 0/7 - 38 6/7 weeks gestation   Normal growth,  comorbid condition: 37 0/7 - 37 6/7 weeks gestation   IUGR of one or both: 36 0/7 - 36 6/7 weeks gestation   IUGR with abnormal UA Doppler, oligohydramnios, or multiple comorbid conditions:    32 0/7 - 34 6/7 weeks gestation    Comorbid conditions include: BMI >= 40, diabetes, hypertension, and complex maternal medical conditions associated with placental dysfunction (Lupus, renal disease, or other vascular disease).       F/u in 4 weeks for MFM visit /growth ultrasound    Yaritza Barreto MD  Maternal Fetal Medicine

## 2025-07-15 NOTE — ASSESSMENT & PLAN NOTE
We have discussed the complex etiology of hyperemesis in pregnancy. She reports having similar (although slightly worse) symptoms in her previous pregnancy.   Her current regimen is as follows:  -Famotidine daily  -Unisom/B6 daily   -Reglan TID  -Zofran Q6h  -Scopolamine patches  -Phenergan supp PRN (although does not use)    She admits to not hydrating well even prior to pregnancy. She reports not hydrating well currently due to constant nausea. Encouraged PO hydration. If unable to hydrate adequately, may consider Home Health for IV fluids.   She also has complaints of severe constipation given her medication regimen and inability to hydrate. Recommend Colace BID and Miralax daily.    7/15/25- Admits to feeling better so forgot to take meds as scheduled at which time she began experiencing severe nausea and vomiting. She states she's had to receive IV fluids through urgent care/ED due to dehydration. Home Health will be going to her home this Friday to start IV fluids at home to stay ahead. Currently taking Reglan TID, Unisom/B6, Benadryl QHS, Reglan TID, Zofran (although currently out of Rx and unable to fill until end of month), Prilosec, and Scopolamine patches (feels as though patches are making N/V worse). She was prescribed Tigan but was unable to fill this at her pharmacy. She did not start Colace or Miralax d/t complaints of constipation last visit. States constipation has improved.  Changes-I recommend she discontinue Reglan as her mood has been more depressed.  I recommend starting Thorazine and this was sent to the pharmacy.

## 2025-07-15 NOTE — ASSESSMENT & PLAN NOTE
Please see prior documentation for specific counseling.      BMI 40    Recommendations:  TWG goal is 11-20 lbs  Screen for signs/symptoms of obstructive sleep apnea  Nutritionist consult offered (this is to be ordered by primary OB provider)  Consider early 1 hr GCT (between 14-16 weeks gestation); repeat at 24-28 weeks gestation  Lovenox 40 mg BID for VTE prophylaxis while admitted to the hospital (antepartum or postpartum) if BMI >= 40.   Encouraged breastfeeding  Postpartum lifestyle modifications & weight loss

## 2025-07-15 NOTE — ASSESSMENT & PLAN NOTE
She reports a history of anxiety, depression, and bipolar. She is not taking medication daily. She was taking Cymbalta prior to pregnancy but discontinued at +UPT. She is interested in restarting her medication because she feels the symptoms are returning. Dr Geiger has advised her to restart and she plans to do so. We discussed the usage of Cymbalta in pregnancy and the minimal risks associated with the fetus. Discussed increased risk for peripartum and postpartum mood disorders. Precautions provided.     It is important to optimize mental health conditions during pregnancy in an effort to reduce the risk of postpartum mood disorders. There are options for management including psychotherapy, counseling, cognitive behavioral therapy, exercise/yoga, journaling, and psychiatry services.     7/15/25- Tried restarting Cymbalta, however, has been having hyperemesis and unable to keep it down. Quit trying to take it. Endorses depression symptoms and states she'll be starting therapy through her insurance (telehealth visits once weekly). She's looking forward to this treatment option.  Discontinue Reglan which may be affecting her mood.

## 2025-07-15 NOTE — ASSESSMENT & PLAN NOTE
Prevouisly discussed with the patient the finding of a dichorionic-diamniotic twin pregnancy and the risks associated with this type of pregnancy. I counseled her on the increased incidence of preeclampsia/gestational hypertension, gestational diabetes, fetal growth restriction, anemia, congenital anomalies, need for antepartum hospitalization,  labor/PPROM, stillbirth, and risk of postpartum hemorrhage that can occur in twin pregnancies. We discussed plans for monthly ultrasound surveillance looking for signs of fetal growth restriction.     Recommendations:   Consider transvaginal cervical length screening at time of targeted anatomy ultrasound w/ MFM (scheduled with MFM)  Detailed anatomy surveys at 19-20 weeks (scheduled with MFM)  Fetal growth ultrasounds every 3-4 weeks starting at 23-24 weeks (scheduled with MFM)  Low-dose aspirin daily (81 mg) beginning at 12-16 weeks to reduce the risk of preeclampsia  Folic acid 1 mg daily and ferrous sulfate 325 mg daily  Increase daily dietary intake by approximately 300 kcal above that for a meadows pregnancy, or 600 kcal over that of a nonpregnant woman and monitor weight gain   testing with weekly NST and MVP assessment beginning at 32 weeks (this is to be ordered by primary OB provider)  Given the increased risks of a twin pregnancy, prenatal visits every 2-3 weeks from 24 - 32/34 weeks and weekly prenatal visits thereafter    Delivery timing:   Normal growth and testin 0/7 - 38 6/7 weeks gestation   Normal growth, comorbid condition: 37 0/7 - 37 6/7 weeks gestation   IUGR of one or both: 36 0/7 - 36 6/7 weeks gestation   IUGR with abnormal UA Doppler, oligohydramnios, or multiple comorbid conditions:    32 0/7 - 34 6/7 weeks gestation    Comorbid conditions include: BMI >= 40, diabetes, hypertension, and complex maternal medical conditions associated with placental dysfunction (Lupus, renal disease, or other vascular disease).

## 2025-08-13 ENCOUNTER — PROCEDURE VISIT (OUTPATIENT)
Dept: MATERNAL FETAL MEDICINE | Facility: CLINIC | Age: 30
End: 2025-08-13
Payer: MEDICAID

## 2025-08-13 ENCOUNTER — OFFICE VISIT (OUTPATIENT)
Dept: MATERNAL FETAL MEDICINE | Facility: CLINIC | Age: 30
End: 2025-08-13
Payer: MEDICAID

## 2025-08-13 VITALS
SYSTOLIC BLOOD PRESSURE: 123 MMHG | WEIGHT: 249.44 LBS | HEIGHT: 68 IN | DIASTOLIC BLOOD PRESSURE: 81 MMHG | HEART RATE: 120 BPM | BODY MASS INDEX: 37.8 KG/M2

## 2025-08-13 DIAGNOSIS — G47.39 OTHER SLEEP APNEA: ICD-10-CM

## 2025-08-13 DIAGNOSIS — O99.212 SEVERE OBESITY DUE TO EXCESS CALORIES AFFECTING PREGNANCY IN SECOND TRIMESTER: ICD-10-CM

## 2025-08-13 DIAGNOSIS — O21.0 HYPEREMESIS AFFECTING PREGNANCY, ANTEPARTUM: ICD-10-CM

## 2025-08-13 DIAGNOSIS — O99.342 MENTAL DISORDER AFFECTING PREGNANCY IN SECOND TRIMESTER: ICD-10-CM

## 2025-08-13 DIAGNOSIS — O99.342 MENTAL DISORDER AFFECTING PREGNANCY IN SECOND TRIMESTER: Primary | ICD-10-CM

## 2025-08-13 DIAGNOSIS — E66.01 SEVERE OBESITY DUE TO EXCESS CALORIES AFFECTING PREGNANCY IN SECOND TRIMESTER: ICD-10-CM

## 2025-08-13 DIAGNOSIS — O30.049 DICHORIONIC DIAMNIOTIC TWIN PREGNANCY, ANTEPARTUM: ICD-10-CM

## 2025-08-13 DIAGNOSIS — Z87.59 HISTORY OF GESTATIONAL HYPERTENSION: ICD-10-CM

## 2025-08-13 PROCEDURE — 3079F DIAST BP 80-89 MM HG: CPT | Mod: CPTII,S$GLB,, | Performed by: OBSTETRICS & GYNECOLOGY

## 2025-08-13 PROCEDURE — 1159F MED LIST DOCD IN RCRD: CPT | Mod: CPTII,S$GLB,, | Performed by: OBSTETRICS & GYNECOLOGY

## 2025-08-13 PROCEDURE — 76817 TRANSVAGINAL US OBSTETRIC: CPT | Mod: S$GLB,,, | Performed by: OBSTETRICS & GYNECOLOGY

## 2025-08-13 PROCEDURE — 76811 OB US DETAILED SNGL FETUS: CPT | Mod: S$GLB,,, | Performed by: OBSTETRICS & GYNECOLOGY

## 2025-08-13 PROCEDURE — 3074F SYST BP LT 130 MM HG: CPT | Mod: CPTII,S$GLB,, | Performed by: OBSTETRICS & GYNECOLOGY

## 2025-08-13 PROCEDURE — 3008F BODY MASS INDEX DOCD: CPT | Mod: CPTII,S$GLB,, | Performed by: OBSTETRICS & GYNECOLOGY

## 2025-08-13 PROCEDURE — 99214 OFFICE O/P EST MOD 30 MIN: CPT | Mod: TH,25,S$GLB, | Performed by: OBSTETRICS & GYNECOLOGY

## 2025-08-13 PROCEDURE — 76812 OB US DETAILED ADDL FETUS: CPT | Mod: S$GLB,,, | Performed by: OBSTETRICS & GYNECOLOGY

## 2025-08-13 PROCEDURE — 1160F RVW MEDS BY RX/DR IN RCRD: CPT | Mod: CPTII,S$GLB,, | Performed by: OBSTETRICS & GYNECOLOGY

## 2025-08-13 RX ORDER — BUSPIRONE HYDROCHLORIDE 7.5 MG/1
7.5 TABLET ORAL 3 TIMES DAILY PRN
Qty: 30 TABLET | Refills: 3 | Status: SHIPPED | OUTPATIENT
Start: 2025-08-13

## 2025-08-13 RX ORDER — HEPARIN 100 UNIT/ML
500 SYRINGE INTRAVENOUS
Status: CANCELLED | OUTPATIENT
Start: 2025-08-14

## 2025-08-13 RX ORDER — DEXTROSE, SODIUM CHLORIDE, SODIUM LACTATE, POTASSIUM CHLORIDE, AND CALCIUM CHLORIDE 5; .6; .31; .03; .02 G/100ML; G/100ML; G/100ML; G/100ML; G/100ML
INJECTION, SOLUTION INTRAVENOUS CONTINUOUS
Status: CANCELLED
Start: 2025-08-14

## 2025-08-13 RX ORDER — SODIUM CHLORIDE 0.9 % (FLUSH) 0.9 %
10 SYRINGE (ML) INJECTION
Status: CANCELLED | OUTPATIENT
Start: 2025-08-14

## 2025-08-13 RX ORDER — SODIUM CHLORIDE, SODIUM LACTATE, POTASSIUM CHLORIDE, CALCIUM CHLORIDE 600; 310; 30; 20 MG/100ML; MG/100ML; MG/100ML; MG/100ML
INJECTION, SOLUTION INTRAVENOUS CONTINUOUS
Status: CANCELLED
Start: 2025-08-14

## 2025-08-14 ENCOUNTER — INFUSION (OUTPATIENT)
Dept: INFUSION THERAPY | Facility: HOSPITAL | Age: 30
End: 2025-08-14
Attending: STUDENT IN AN ORGANIZED HEALTH CARE EDUCATION/TRAINING PROGRAM
Payer: MEDICAID

## 2025-08-14 VITALS
SYSTOLIC BLOOD PRESSURE: 145 MMHG | HEIGHT: 68 IN | HEART RATE: 95 BPM | TEMPERATURE: 98 F | WEIGHT: 249.13 LBS | BODY MASS INDEX: 37.76 KG/M2 | RESPIRATION RATE: 18 BRPM | OXYGEN SATURATION: 99 % | DIASTOLIC BLOOD PRESSURE: 78 MMHG

## 2025-08-14 DIAGNOSIS — O21.0 HYPEREMESIS AFFECTING PREGNANCY, ANTEPARTUM: Primary | ICD-10-CM

## 2025-08-14 PROCEDURE — 96360 HYDRATION IV INFUSION INIT: CPT

## 2025-08-14 PROCEDURE — 63600175 PHARM REV CODE 636 W HCPCS: Performed by: STUDENT IN AN ORGANIZED HEALTH CARE EDUCATION/TRAINING PROGRAM

## 2025-08-14 PROCEDURE — 96361 HYDRATE IV INFUSION ADD-ON: CPT

## 2025-08-14 RX ORDER — SODIUM CHLORIDE 0.9 % (FLUSH) 0.9 %
10 SYRINGE (ML) INJECTION
Status: DISCONTINUED | OUTPATIENT
Start: 2025-08-14 | End: 2025-08-14 | Stop reason: HOSPADM

## 2025-08-14 RX ORDER — HEPARIN 100 UNIT/ML
500 SYRINGE INTRAVENOUS
OUTPATIENT
Start: 2025-08-14

## 2025-08-14 RX ORDER — DEXTROSE, SODIUM CHLORIDE, SODIUM LACTATE, POTASSIUM CHLORIDE, AND CALCIUM CHLORIDE 5; .6; .31; .03; .02 G/100ML; G/100ML; G/100ML; G/100ML; G/100ML
INJECTION, SOLUTION INTRAVENOUS ONCE
Status: COMPLETED | OUTPATIENT
Start: 2025-08-14 | End: 2025-08-14

## 2025-08-14 RX ORDER — HEPARIN 100 UNIT/ML
500 SYRINGE INTRAVENOUS
Status: DISCONTINUED | OUTPATIENT
Start: 2025-08-14 | End: 2025-08-14 | Stop reason: HOSPADM

## 2025-08-14 RX ORDER — SODIUM CHLORIDE 0.9 % (FLUSH) 0.9 %
10 SYRINGE (ML) INJECTION
OUTPATIENT
Start: 2025-08-14

## 2025-08-14 RX ORDER — SODIUM CHLORIDE, SODIUM LACTATE, POTASSIUM CHLORIDE, CALCIUM CHLORIDE 600; 310; 30; 20 MG/100ML; MG/100ML; MG/100ML; MG/100ML
INJECTION, SOLUTION INTRAVENOUS CONTINUOUS
Start: 2025-08-14

## 2025-08-14 RX ORDER — DEXTROSE, SODIUM CHLORIDE, SODIUM LACTATE, POTASSIUM CHLORIDE, AND CALCIUM CHLORIDE 5; .6; .31; .03; .02 G/100ML; G/100ML; G/100ML; G/100ML; G/100ML
INJECTION, SOLUTION INTRAVENOUS CONTINUOUS
Start: 2025-08-14

## 2025-08-14 RX ORDER — SODIUM CHLORIDE, SODIUM LACTATE, POTASSIUM CHLORIDE, CALCIUM CHLORIDE 600; 310; 30; 20 MG/100ML; MG/100ML; MG/100ML; MG/100ML
INJECTION, SOLUTION INTRAVENOUS CONTINUOUS
Status: DISCONTINUED | OUTPATIENT
Start: 2025-08-14 | End: 2025-08-14 | Stop reason: HOSPADM

## 2025-08-14 RX ADMIN — SODIUM CHLORIDE, SODIUM LACTATE, POTASSIUM CHLORIDE, AND CALCIUM CHLORIDE: 600; 310; 30; 20 INJECTION, SOLUTION INTRAVENOUS at 02:08

## 2025-08-14 RX ADMIN — DEXTROSE, SODIUM CHLORIDE, SODIUM LACTATE, POTASSIUM CHLORIDE, AND CALCIUM CHLORIDE: 5; .6; .31; .03; .02 INJECTION, SOLUTION INTRAVENOUS at 01:08

## 2025-08-19 ENCOUNTER — PATIENT MESSAGE (OUTPATIENT)
Dept: MATERNAL FETAL MEDICINE | Facility: CLINIC | Age: 30
End: 2025-08-19
Payer: MEDICAID

## 2025-08-19 ENCOUNTER — INFUSION (OUTPATIENT)
Dept: INFUSION THERAPY | Facility: HOSPITAL | Age: 30
End: 2025-08-19
Attending: STUDENT IN AN ORGANIZED HEALTH CARE EDUCATION/TRAINING PROGRAM
Payer: MEDICAID

## 2025-08-19 VITALS
TEMPERATURE: 98 F | HEART RATE: 95 BPM | OXYGEN SATURATION: 98 % | RESPIRATION RATE: 18 BRPM | DIASTOLIC BLOOD PRESSURE: 78 MMHG | SYSTOLIC BLOOD PRESSURE: 114 MMHG | HEIGHT: 68 IN | WEIGHT: 251.63 LBS | BODY MASS INDEX: 38.14 KG/M2

## 2025-08-19 DIAGNOSIS — O21.0 HYPEREMESIS AFFECTING PREGNANCY, ANTEPARTUM: Primary | ICD-10-CM

## 2025-08-19 PROCEDURE — 25000003 PHARM REV CODE 250: Performed by: STUDENT IN AN ORGANIZED HEALTH CARE EDUCATION/TRAINING PROGRAM

## 2025-08-19 PROCEDURE — 96360 HYDRATION IV INFUSION INIT: CPT

## 2025-08-19 PROCEDURE — A4216 STERILE WATER/SALINE, 10 ML: HCPCS | Performed by: STUDENT IN AN ORGANIZED HEALTH CARE EDUCATION/TRAINING PROGRAM

## 2025-08-19 PROCEDURE — 63600175 PHARM REV CODE 636 W HCPCS: Performed by: STUDENT IN AN ORGANIZED HEALTH CARE EDUCATION/TRAINING PROGRAM

## 2025-08-19 PROCEDURE — 96361 HYDRATE IV INFUSION ADD-ON: CPT

## 2025-08-19 RX ORDER — DEXTROSE, SODIUM CHLORIDE, SODIUM LACTATE, POTASSIUM CHLORIDE, AND CALCIUM CHLORIDE 5; .6; .31; .03; .02 G/100ML; G/100ML; G/100ML; G/100ML; G/100ML
INJECTION, SOLUTION INTRAVENOUS ONCE
Status: COMPLETED | OUTPATIENT
Start: 2025-08-19 | End: 2025-08-19

## 2025-08-19 RX ORDER — SODIUM CHLORIDE 0.9 % (FLUSH) 0.9 %
10 SYRINGE (ML) INJECTION
Status: CANCELLED | OUTPATIENT
Start: 2025-08-19

## 2025-08-19 RX ORDER — SODIUM CHLORIDE 0.9 % (FLUSH) 0.9 %
10 SYRINGE (ML) INJECTION
Status: DISCONTINUED | OUTPATIENT
Start: 2025-08-19 | End: 2025-08-19 | Stop reason: HOSPADM

## 2025-08-19 RX ORDER — DEXTROSE, SODIUM CHLORIDE, SODIUM LACTATE, POTASSIUM CHLORIDE, AND CALCIUM CHLORIDE 5; .6; .31; .03; .02 G/100ML; G/100ML; G/100ML; G/100ML; G/100ML
INJECTION, SOLUTION INTRAVENOUS CONTINUOUS
Status: CANCELLED
Start: 2025-08-19

## 2025-08-19 RX ORDER — SODIUM CHLORIDE, SODIUM LACTATE, POTASSIUM CHLORIDE, CALCIUM CHLORIDE 600; 310; 30; 20 MG/100ML; MG/100ML; MG/100ML; MG/100ML
INJECTION, SOLUTION INTRAVENOUS CONTINUOUS
Status: DISCONTINUED | OUTPATIENT
Start: 2025-08-19 | End: 2025-08-19 | Stop reason: HOSPADM

## 2025-08-19 RX ORDER — HEPARIN 100 UNIT/ML
500 SYRINGE INTRAVENOUS
Status: DISCONTINUED | OUTPATIENT
Start: 2025-08-19 | End: 2025-08-19 | Stop reason: HOSPADM

## 2025-08-19 RX ORDER — SODIUM CHLORIDE, SODIUM LACTATE, POTASSIUM CHLORIDE, CALCIUM CHLORIDE 600; 310; 30; 20 MG/100ML; MG/100ML; MG/100ML; MG/100ML
INJECTION, SOLUTION INTRAVENOUS CONTINUOUS
Status: CANCELLED
Start: 2025-08-19

## 2025-08-19 RX ORDER — HEPARIN 100 UNIT/ML
500 SYRINGE INTRAVENOUS
Status: CANCELLED | OUTPATIENT
Start: 2025-08-19

## 2025-08-19 RX ADMIN — SODIUM CHLORIDE, SODIUM LACTATE, POTASSIUM CHLORIDE, AND CALCIUM CHLORIDE: 600; 310; 30; 20 INJECTION, SOLUTION INTRAVENOUS at 02:08

## 2025-08-19 RX ADMIN — Medication 10 ML: at 01:08

## 2025-08-19 RX ADMIN — DEXTROSE, SODIUM CHLORIDE, SODIUM LACTATE, POTASSIUM CHLORIDE, AND CALCIUM CHLORIDE: 5; .6; .31; .03; .02 INJECTION, SOLUTION INTRAVENOUS at 01:08

## 2025-08-22 ENCOUNTER — INFUSION (OUTPATIENT)
Dept: INFUSION THERAPY | Facility: HOSPITAL | Age: 30
End: 2025-08-22
Attending: STUDENT IN AN ORGANIZED HEALTH CARE EDUCATION/TRAINING PROGRAM
Payer: MEDICAID

## 2025-08-22 VITALS
HEART RATE: 114 BPM | TEMPERATURE: 98 F | SYSTOLIC BLOOD PRESSURE: 118 MMHG | DIASTOLIC BLOOD PRESSURE: 80 MMHG | RESPIRATION RATE: 18 BRPM | OXYGEN SATURATION: 98 %

## 2025-08-22 DIAGNOSIS — O21.0 HYPEREMESIS AFFECTING PREGNANCY, ANTEPARTUM: Primary | ICD-10-CM

## 2025-08-22 PROCEDURE — 96366 THER/PROPH/DIAG IV INF ADDON: CPT

## 2025-08-22 PROCEDURE — 25000003 PHARM REV CODE 250: Performed by: STUDENT IN AN ORGANIZED HEALTH CARE EDUCATION/TRAINING PROGRAM

## 2025-08-22 PROCEDURE — 96365 THER/PROPH/DIAG IV INF INIT: CPT

## 2025-08-22 PROCEDURE — 96375 TX/PRO/DX INJ NEW DRUG ADDON: CPT

## 2025-08-22 PROCEDURE — 96368 THER/DIAG CONCURRENT INF: CPT

## 2025-08-22 PROCEDURE — 63600175 PHARM REV CODE 636 W HCPCS: Performed by: STUDENT IN AN ORGANIZED HEALTH CARE EDUCATION/TRAINING PROGRAM

## 2025-08-22 RX ORDER — HEPARIN 100 UNIT/ML
500 SYRINGE INTRAVENOUS
OUTPATIENT
Start: 2025-08-22

## 2025-08-22 RX ORDER — SODIUM CHLORIDE, SODIUM LACTATE, POTASSIUM CHLORIDE, CALCIUM CHLORIDE 600; 310; 30; 20 MG/100ML; MG/100ML; MG/100ML; MG/100ML
INJECTION, SOLUTION INTRAVENOUS CONTINUOUS
Start: 2025-08-22

## 2025-08-22 RX ORDER — SODIUM CHLORIDE, SODIUM LACTATE, POTASSIUM CHLORIDE, CALCIUM CHLORIDE 600; 310; 30; 20 MG/100ML; MG/100ML; MG/100ML; MG/100ML
INJECTION, SOLUTION INTRAVENOUS
Status: COMPLETED | OUTPATIENT
Start: 2025-08-22 | End: 2025-08-22

## 2025-08-22 RX ORDER — SODIUM CHLORIDE 0.9 % (FLUSH) 0.9 %
10 SYRINGE (ML) INJECTION
Status: DISCONTINUED | OUTPATIENT
Start: 2025-08-22 | End: 2025-08-22 | Stop reason: HOSPADM

## 2025-08-22 RX ORDER — SODIUM CHLORIDE 0.9 % (FLUSH) 0.9 %
10 SYRINGE (ML) INJECTION
OUTPATIENT
Start: 2025-08-22

## 2025-08-22 RX ORDER — HEPARIN 100 UNIT/ML
500 SYRINGE INTRAVENOUS
Status: DISCONTINUED | OUTPATIENT
Start: 2025-08-22 | End: 2025-08-22 | Stop reason: HOSPADM

## 2025-08-22 RX ADMIN — SODIUM CHLORIDE, SODIUM LACTATE, POTASSIUM CHLORIDE, AND CALCIUM CHLORIDE: 600; 310; 30; 20 INJECTION, SOLUTION INTRAVENOUS at 11:08

## 2025-08-22 RX ADMIN — THIAMINE HYDROCHLORIDE: 100 INJECTION, SOLUTION INTRAMUSCULAR; INTRAVENOUS at 11:08

## 2025-08-22 RX ADMIN — ONDANSETRON 8 MG: 2 INJECTION INTRAMUSCULAR; INTRAVENOUS at 11:08

## 2025-08-28 ENCOUNTER — INFUSION (OUTPATIENT)
Dept: INFUSION THERAPY | Facility: HOSPITAL | Age: 30
End: 2025-08-28
Attending: STUDENT IN AN ORGANIZED HEALTH CARE EDUCATION/TRAINING PROGRAM
Payer: MEDICAID

## 2025-08-28 ENCOUNTER — OFFICE VISIT (OUTPATIENT)
Dept: MATERNAL FETAL MEDICINE | Facility: CLINIC | Age: 30
End: 2025-08-28
Payer: MEDICAID

## 2025-08-28 VITALS
SYSTOLIC BLOOD PRESSURE: 113 MMHG | DIASTOLIC BLOOD PRESSURE: 77 MMHG | BODY MASS INDEX: 38.26 KG/M2 | WEIGHT: 252.44 LBS | HEIGHT: 68 IN | HEART RATE: 101 BPM

## 2025-08-28 VITALS
OXYGEN SATURATION: 97 % | BODY MASS INDEX: 38.25 KG/M2 | TEMPERATURE: 98 F | HEART RATE: 97 BPM | WEIGHT: 251.56 LBS | SYSTOLIC BLOOD PRESSURE: 118 MMHG | DIASTOLIC BLOOD PRESSURE: 82 MMHG

## 2025-08-28 DIAGNOSIS — O21.0 HYPEREMESIS AFFECTING PREGNANCY, ANTEPARTUM: ICD-10-CM

## 2025-08-28 DIAGNOSIS — R51.9 PREGNANCY HEADACHE IN SECOND TRIMESTER: ICD-10-CM

## 2025-08-28 DIAGNOSIS — O21.0 HYPEREMESIS AFFECTING PREGNANCY, ANTEPARTUM: Primary | ICD-10-CM

## 2025-08-28 DIAGNOSIS — O26.892 PREGNANCY HEADACHE IN SECOND TRIMESTER: ICD-10-CM

## 2025-08-28 DIAGNOSIS — O99.342 MENTAL DISORDER AFFECTING PREGNANCY IN SECOND TRIMESTER: Primary | ICD-10-CM

## 2025-08-28 PROCEDURE — 96366 THER/PROPH/DIAG IV INF ADDON: CPT

## 2025-08-28 PROCEDURE — 1159F MED LIST DOCD IN RCRD: CPT | Mod: CPTII,S$GLB,, | Performed by: OBSTETRICS & GYNECOLOGY

## 2025-08-28 PROCEDURE — 25000003 PHARM REV CODE 250: Performed by: STUDENT IN AN ORGANIZED HEALTH CARE EDUCATION/TRAINING PROGRAM

## 2025-08-28 PROCEDURE — 3078F DIAST BP <80 MM HG: CPT | Mod: CPTII,S$GLB,, | Performed by: OBSTETRICS & GYNECOLOGY

## 2025-08-28 PROCEDURE — 99214 OFFICE O/P EST MOD 30 MIN: CPT | Mod: TH,S$GLB,, | Performed by: OBSTETRICS & GYNECOLOGY

## 2025-08-28 PROCEDURE — 3074F SYST BP LT 130 MM HG: CPT | Mod: CPTII,S$GLB,, | Performed by: OBSTETRICS & GYNECOLOGY

## 2025-08-28 PROCEDURE — 63600175 PHARM REV CODE 636 W HCPCS: Performed by: STUDENT IN AN ORGANIZED HEALTH CARE EDUCATION/TRAINING PROGRAM

## 2025-08-28 PROCEDURE — 1160F RVW MEDS BY RX/DR IN RCRD: CPT | Mod: CPTII,S$GLB,, | Performed by: OBSTETRICS & GYNECOLOGY

## 2025-08-28 PROCEDURE — 3008F BODY MASS INDEX DOCD: CPT | Mod: CPTII,S$GLB,, | Performed by: OBSTETRICS & GYNECOLOGY

## 2025-08-28 PROCEDURE — 96365 THER/PROPH/DIAG IV INF INIT: CPT

## 2025-08-28 RX ORDER — ACETAMINOPHEN 500 MG
500 TABLET ORAL EVERY 4 HOURS PRN
COMMUNITY

## 2025-08-28 RX ORDER — PROPRANOLOL HYDROCHLORIDE 80 MG/1
80 CAPSULE, EXTENDED RELEASE ORAL NIGHTLY
Qty: 30 CAPSULE | Refills: 2 | Status: SHIPPED | OUTPATIENT
Start: 2025-08-28

## 2025-08-28 RX ORDER — ERGOCALCIFEROL 1.25 MG/1
50000 CAPSULE ORAL
COMMUNITY
Start: 2025-08-26 | End: 2025-08-28

## 2025-08-28 RX ORDER — SODIUM CHLORIDE 0.9 % (FLUSH) 0.9 %
10 SYRINGE (ML) INJECTION
OUTPATIENT
Start: 2025-08-28

## 2025-08-28 RX ORDER — SODIUM CHLORIDE, SODIUM LACTATE, POTASSIUM CHLORIDE, CALCIUM CHLORIDE 600; 310; 30; 20 MG/100ML; MG/100ML; MG/100ML; MG/100ML
INJECTION, SOLUTION INTRAVENOUS CONTINUOUS
Start: 2025-08-28

## 2025-08-28 RX ORDER — SODIUM CHLORIDE 0.9 % (FLUSH) 0.9 %
10 SYRINGE (ML) INJECTION
Status: DISCONTINUED | OUTPATIENT
Start: 2025-08-28 | End: 2025-08-28 | Stop reason: HOSPADM

## 2025-08-28 RX ORDER — HEPARIN 100 UNIT/ML
500 SYRINGE INTRAVENOUS
OUTPATIENT
Start: 2025-08-28

## 2025-08-28 RX ORDER — BUTALBITAL, ACETAMINOPHEN AND CAFFEINE 50; 325; 40 MG/1; MG/1; MG/1
1 TABLET ORAL EVERY 6 HOURS PRN
Qty: 20 TABLET | Refills: 1 | Status: SHIPPED | OUTPATIENT
Start: 2025-08-28

## 2025-08-28 RX ORDER — SODIUM CHLORIDE, SODIUM LACTATE, POTASSIUM CHLORIDE, CALCIUM CHLORIDE 600; 310; 30; 20 MG/100ML; MG/100ML; MG/100ML; MG/100ML
INJECTION, SOLUTION INTRAVENOUS ONCE
Status: COMPLETED | OUTPATIENT
Start: 2025-08-28 | End: 2025-08-28

## 2025-08-28 RX ORDER — HEPARIN 100 UNIT/ML
500 SYRINGE INTRAVENOUS
Status: DISCONTINUED | OUTPATIENT
Start: 2025-08-28 | End: 2025-08-28 | Stop reason: HOSPADM

## 2025-08-28 RX ADMIN — SODIUM CHLORIDE, SODIUM LACTATE, POTASSIUM CHLORIDE, AND CALCIUM CHLORIDE: 600; 310; 30; 20 INJECTION, SOLUTION INTRAVENOUS at 01:08

## 2025-08-28 RX ADMIN — THIAMINE HYDROCHLORIDE: 100 INJECTION, SOLUTION INTRAMUSCULAR; INTRAVENOUS at 01:08

## 2025-09-03 ENCOUNTER — INFUSION (OUTPATIENT)
Dept: INFUSION THERAPY | Facility: HOSPITAL | Age: 30
End: 2025-09-03
Attending: STUDENT IN AN ORGANIZED HEALTH CARE EDUCATION/TRAINING PROGRAM
Payer: MEDICAID

## 2025-09-03 VITALS
RESPIRATION RATE: 18 BRPM | HEART RATE: 91 BPM | SYSTOLIC BLOOD PRESSURE: 104 MMHG | DIASTOLIC BLOOD PRESSURE: 62 MMHG | OXYGEN SATURATION: 98 % | TEMPERATURE: 98 F

## 2025-09-03 DIAGNOSIS — O21.0 HYPEREMESIS AFFECTING PREGNANCY, ANTEPARTUM: Primary | ICD-10-CM

## 2025-09-03 PROCEDURE — 25000003 PHARM REV CODE 250: Performed by: STUDENT IN AN ORGANIZED HEALTH CARE EDUCATION/TRAINING PROGRAM

## 2025-09-03 PROCEDURE — 96375 TX/PRO/DX INJ NEW DRUG ADDON: CPT

## 2025-09-03 PROCEDURE — 63600175 PHARM REV CODE 636 W HCPCS: Performed by: STUDENT IN AN ORGANIZED HEALTH CARE EDUCATION/TRAINING PROGRAM

## 2025-09-03 PROCEDURE — 96365 THER/PROPH/DIAG IV INF INIT: CPT

## 2025-09-03 PROCEDURE — 96366 THER/PROPH/DIAG IV INF ADDON: CPT

## 2025-09-03 RX ORDER — HEPARIN 100 UNIT/ML
500 SYRINGE INTRAVENOUS
Status: DISCONTINUED | OUTPATIENT
Start: 2025-09-03 | End: 2025-09-03 | Stop reason: HOSPADM

## 2025-09-03 RX ORDER — HEPARIN 100 UNIT/ML
500 SYRINGE INTRAVENOUS
OUTPATIENT
Start: 2025-09-03

## 2025-09-03 RX ORDER — SODIUM CHLORIDE, SODIUM LACTATE, POTASSIUM CHLORIDE, CALCIUM CHLORIDE 600; 310; 30; 20 MG/100ML; MG/100ML; MG/100ML; MG/100ML
INJECTION, SOLUTION INTRAVENOUS CONTINUOUS
OUTPATIENT
Start: 2025-09-03

## 2025-09-03 RX ORDER — SODIUM CHLORIDE 0.9 % (FLUSH) 0.9 %
10 SYRINGE (ML) INJECTION
Status: DISCONTINUED | OUTPATIENT
Start: 2025-09-03 | End: 2025-09-03 | Stop reason: HOSPADM

## 2025-09-03 RX ORDER — SODIUM CHLORIDE, SODIUM LACTATE, POTASSIUM CHLORIDE, CALCIUM CHLORIDE 600; 310; 30; 20 MG/100ML; MG/100ML; MG/100ML; MG/100ML
INJECTION, SOLUTION INTRAVENOUS ONCE
Status: COMPLETED | OUTPATIENT
Start: 2025-09-03 | End: 2025-09-03

## 2025-09-03 RX ORDER — SODIUM CHLORIDE 0.9 % (FLUSH) 0.9 %
10 SYRINGE (ML) INJECTION
OUTPATIENT
Start: 2025-09-03

## 2025-09-03 RX ADMIN — THIAMINE HYDROCHLORIDE: 100 INJECTION, SOLUTION INTRAMUSCULAR; INTRAVENOUS at 01:09

## 2025-09-03 RX ADMIN — ONDANSETRON 8 MG: 2 INJECTION INTRAMUSCULAR; INTRAVENOUS at 01:09

## 2025-09-03 RX ADMIN — SODIUM CHLORIDE, SODIUM LACTATE, POTASSIUM CHLORIDE, AND CALCIUM CHLORIDE: 600; 310; 30; 20 INJECTION, SOLUTION INTRAVENOUS at 01:09

## (undated) DEVICE — COLLECTION SPECIMEN NEPTUNE

## (undated) DEVICE — KIT CANIST SUCTION 1200CC

## (undated) DEVICE — KIT SURGICAL COLON .25 1.1OZ

## (undated) DEVICE — MANOSCAN

## (undated) DEVICE — TUBING O2 FEMALE CONN 13FT

## (undated) DEVICE — SOL IRR SOD CHL .9% POUR

## (undated) DEVICE — CAPSULE BRAVO PH W/DEL SYS

## (undated) DEVICE — CYLINDER 1000 ML GRADUATED

## (undated) DEVICE — TIP SUCTION YANKAUER

## (undated) DEVICE — SOL IRRI STRL WATER 1000ML

## (undated) DEVICE — SYR 50ML CATH TIP